# Patient Record
Sex: FEMALE | Race: WHITE | NOT HISPANIC OR LATINO | Employment: OTHER | URBAN - METROPOLITAN AREA
[De-identification: names, ages, dates, MRNs, and addresses within clinical notes are randomized per-mention and may not be internally consistent; named-entity substitution may affect disease eponyms.]

---

## 2017-09-18 ENCOUNTER — HOSPITAL ENCOUNTER (OUTPATIENT)
Dept: FAMILY MEDICINE CLINIC | Facility: CLINIC | Age: 67
Setting detail: SPECIMEN
Discharge: HOME | End: 2017-09-18
Attending: FAMILY MEDICINE | Admitting: FAMILY MEDICINE

## 2018-02-15 ENCOUNTER — OFFICE (AMBULATORY)
Dept: URBAN - METROPOLITAN AREA PATHOLOGY 4 | Facility: PATHOLOGY | Age: 68
End: 2018-02-15

## 2018-02-15 ENCOUNTER — HOSPITAL ENCOUNTER (OUTPATIENT)
Dept: OTHER | Facility: HOSPITAL | Age: 68
Setting detail: SPECIMEN
Discharge: HOME | End: 2018-02-15
Attending: INTERNAL MEDICINE | Admitting: INTERNAL MEDICINE

## 2018-02-15 ENCOUNTER — ON CAMPUS - OUTPATIENT (AMBULATORY)
Dept: URBAN - METROPOLITAN AREA HOSPITAL 2 | Facility: HOSPITAL | Age: 68
End: 2018-02-15

## 2018-02-15 VITALS
HEART RATE: 64 BPM | HEART RATE: 68 BPM | OXYGEN SATURATION: 97 % | HEART RATE: 62 BPM | OXYGEN SATURATION: 98 % | SYSTOLIC BLOOD PRESSURE: 103 MMHG | DIASTOLIC BLOOD PRESSURE: 74 MMHG | DIASTOLIC BLOOD PRESSURE: 70 MMHG | WEIGHT: 193.4 LBS | DIASTOLIC BLOOD PRESSURE: 90 MMHG | SYSTOLIC BLOOD PRESSURE: 127 MMHG | TEMPERATURE: 98.1 F | HEART RATE: 84 BPM | DIASTOLIC BLOOD PRESSURE: 49 MMHG | DIASTOLIC BLOOD PRESSURE: 79 MMHG | HEART RATE: 71 BPM | HEART RATE: 72 BPM | DIASTOLIC BLOOD PRESSURE: 80 MMHG | SYSTOLIC BLOOD PRESSURE: 107 MMHG | HEIGHT: 67 IN | RESPIRATION RATE: 16 BRPM | OXYGEN SATURATION: 92 % | DIASTOLIC BLOOD PRESSURE: 69 MMHG | OXYGEN SATURATION: 96 % | HEART RATE: 66 BPM | HEART RATE: 65 BPM | SYSTOLIC BLOOD PRESSURE: 122 MMHG | DIASTOLIC BLOOD PRESSURE: 73 MMHG | SYSTOLIC BLOOD PRESSURE: 146 MMHG | DIASTOLIC BLOOD PRESSURE: 86 MMHG | SYSTOLIC BLOOD PRESSURE: 116 MMHG | SYSTOLIC BLOOD PRESSURE: 102 MMHG | SYSTOLIC BLOOD PRESSURE: 71 MMHG | RESPIRATION RATE: 18 BRPM

## 2018-02-15 DIAGNOSIS — K57.30 DIVERTICULOSIS OF LARGE INTESTINE WITHOUT PERFORATION OR ABS: ICD-10-CM

## 2018-02-15 DIAGNOSIS — D12.0 BENIGN NEOPLASM OF CECUM: ICD-10-CM

## 2018-02-15 DIAGNOSIS — D12.5 BENIGN NEOPLASM OF SIGMOID COLON: ICD-10-CM

## 2018-02-15 DIAGNOSIS — Z86.010 PERSONAL HISTORY OF COLONIC POLYPS: ICD-10-CM

## 2018-02-15 LAB
GI HISTOLOGY: A. UNSPECIFIED: (no result)
GI HISTOLOGY: B. UNSPECIFIED: (no result)
GI HISTOLOGY: PDF REPORT: (no result)

## 2018-02-15 PROCEDURE — 45385 COLONOSCOPY W/LESION REMOVAL: CPT | Performed by: INTERNAL MEDICINE

## 2018-02-15 PROCEDURE — 88305 TISSUE EXAM BY PATHOLOGIST: CPT | Mod: 26 | Performed by: INTERNAL MEDICINE

## 2018-02-15 RX ADMIN — PROPOFOL: 10 INJECTION, EMULSION INTRAVENOUS at 08:33

## 2018-08-13 ENCOUNTER — HOSPITAL ENCOUNTER (OUTPATIENT)
Dept: FAMILY MEDICINE CLINIC | Facility: CLINIC | Age: 68
Setting detail: SPECIMEN
Discharge: HOME | End: 2018-08-13
Attending: FAMILY MEDICINE | Admitting: FAMILY MEDICINE

## 2018-08-13 LAB
ALBUMIN SERPL-MCNC: 3.9 G/DL (ref 3.5–4.8)
ALBUMIN/GLOB SERPL: 1.3 {RATIO} (ref 1–1.7)
ALP SERPL-CCNC: 40 IU/L (ref 32–91)
ALT SERPL-CCNC: 65 IU/L (ref 14–54)
ANION GAP SERPL CALC-SCNC: 11.5 MMOL/L (ref 10–20)
AST SERPL-CCNC: 51 IU/L (ref 15–41)
BILIRUB SERPL-MCNC: 0.6 MG/DL (ref 0.3–1.2)
BUN SERPL-MCNC: 20 MG/DL (ref 8–20)
BUN/CREAT SERPL: 22.2 (ref 5.4–26.2)
CALCIUM SERPL-MCNC: 9.3 MG/DL (ref 8.9–10.3)
CHLORIDE SERPL-SCNC: 107 MMOL/L (ref 101–111)
CHOLEST SERPL-MCNC: 193 MG/DL
CHOLEST/HDLC SERPL: 2.8 {RATIO}
CONV CO2: 25 MMOL/L (ref 22–32)
CONV LDL CHOLESTEROL DIRECT: 99 MG/DL (ref 0–100)
CONV TOTAL PROTEIN: 6.9 G/DL (ref 6.1–7.9)
CREAT UR-MCNC: 0.9 MG/DL (ref 0.4–1)
GLOBULIN UR ELPH-MCNC: 3 G/DL (ref 2.5–3.8)
GLUCOSE SERPL-MCNC: 100 MG/DL (ref 65–99)
HDLC SERPL-MCNC: 68 MG/DL
LDLC/HDLC SERPL: 1.5 {RATIO}
LIPID INTERPRETATION: ABNORMAL
POTASSIUM SERPL-SCNC: 4.5 MMOL/L (ref 3.6–5.1)
SODIUM SERPL-SCNC: 139 MMOL/L (ref 136–144)
TRIGL SERPL-MCNC: 69 MG/DL
VLDLC SERPL CALC-MCNC: 25.3 MG/DL

## 2018-09-13 ENCOUNTER — ON CAMPUS - OUTPATIENT (AMBULATORY)
Dept: URBAN - METROPOLITAN AREA HOSPITAL 2 | Facility: HOSPITAL | Age: 68
End: 2018-09-13

## 2018-09-13 VITALS
SYSTOLIC BLOOD PRESSURE: 130 MMHG | DIASTOLIC BLOOD PRESSURE: 106 MMHG | DIASTOLIC BLOOD PRESSURE: 77 MMHG | OXYGEN SATURATION: 95 % | OXYGEN SATURATION: 97 % | HEART RATE: 74 BPM | WEIGHT: 189 LBS | OXYGEN SATURATION: 93 % | HEART RATE: 64 BPM | RESPIRATION RATE: 16 BRPM | SYSTOLIC BLOOD PRESSURE: 137 MMHG | DIASTOLIC BLOOD PRESSURE: 88 MMHG | OXYGEN SATURATION: 94 % | RESPIRATION RATE: 18 BRPM | SYSTOLIC BLOOD PRESSURE: 146 MMHG | HEART RATE: 72 BPM | DIASTOLIC BLOOD PRESSURE: 98 MMHG | HEART RATE: 75 BPM | RESPIRATION RATE: 14 BRPM | DIASTOLIC BLOOD PRESSURE: 89 MMHG | SYSTOLIC BLOOD PRESSURE: 150 MMHG | HEART RATE: 69 BPM | HEART RATE: 77 BPM | HEIGHT: 67 IN | DIASTOLIC BLOOD PRESSURE: 81 MMHG | OXYGEN SATURATION: 96 % | RESPIRATION RATE: 19 BRPM | SYSTOLIC BLOOD PRESSURE: 118 MMHG | TEMPERATURE: 98.6 F

## 2018-09-13 DIAGNOSIS — K44.9 DIAPHRAGMATIC HERNIA WITHOUT OBSTRUCTION OR GANGRENE: ICD-10-CM

## 2018-09-13 DIAGNOSIS — K22.2 ESOPHAGEAL OBSTRUCTION: ICD-10-CM

## 2018-09-13 DIAGNOSIS — K25.9 GASTRIC ULCER, UNSPECIFIED AS ACUTE OR CHRONIC, WITHOUT HEMO: ICD-10-CM

## 2018-09-13 DIAGNOSIS — R13.10 DYSPHAGIA, UNSPECIFIED: ICD-10-CM

## 2018-09-13 PROCEDURE — 43249 ESOPH EGD DILATION <30 MM: CPT | Performed by: INTERNAL MEDICINE

## 2018-09-13 RX ORDER — PANTOPRAZOLE SODIUM 40 MG/1
TABLET, DELAYED RELEASE ORAL
Qty: 90 | Refills: 1 | Status: COMPLETED
End: 2021-11-22

## 2019-08-21 RX ORDER — ROSUVASTATIN CALCIUM 20 MG/1
TABLET, COATED ORAL
Qty: 90 TABLET | Refills: 1 | Status: SHIPPED | OUTPATIENT
Start: 2019-08-21 | End: 2020-02-24 | Stop reason: SDUPTHER

## 2019-09-03 RX ORDER — LISINOPRIL 10 MG/1
TABLET ORAL
Qty: 90 TABLET | Refills: 0 | Status: SHIPPED | OUTPATIENT
Start: 2019-09-03 | End: 2019-09-15 | Stop reason: SDUPTHER

## 2019-09-11 RX ORDER — LEVOTHYROXINE SODIUM 175 UG/1
TABLET ORAL
Qty: 90 TABLET | Refills: 1 | Status: SHIPPED | OUTPATIENT
Start: 2019-09-11 | End: 2019-09-30

## 2019-09-16 RX ORDER — LISINOPRIL 10 MG/1
TABLET ORAL
Qty: 90 TABLET | Refills: 0 | Status: SHIPPED | OUTPATIENT
Start: 2019-09-16 | End: 2021-11-05 | Stop reason: SDUPTHER

## 2019-09-25 DIAGNOSIS — Z00.00 ENCOUNTER FOR GENERAL ADULT MEDICAL EXAMINATION WITHOUT ABNORMAL FINDINGS: ICD-10-CM

## 2019-09-25 DIAGNOSIS — I10 HYPERTENSION, UNSPECIFIED TYPE: ICD-10-CM

## 2019-09-25 DIAGNOSIS — E78.5 HYPERLIPIDEMIA, UNSPECIFIED HYPERLIPIDEMIA TYPE: Primary | ICD-10-CM

## 2019-09-25 DIAGNOSIS — E03.9 HYPOTHYROIDISM, UNSPECIFIED TYPE: ICD-10-CM

## 2019-09-26 LAB
ALBUMIN SERPL-MCNC: 3.8 G/DL (ref 3.5–4.8)
ALBUMIN/GLOB SERPL: 1.3 G/DL (ref 1–1.7)
ALP SERPL-CCNC: 44 U/L (ref 32–91)
ALT SERPL W P-5'-P-CCNC: 69 U/L (ref 14–54)
ANION GAP SERPL CALCULATED.3IONS-SCNC: 12 MMOL/L (ref 5–15)
ARTICHOKE IGE QN: 131 MG/DL (ref 0–100)
AST SERPL-CCNC: 43 U/L (ref 15–41)
BASOPHILS # BLD AUTO: 0.1 10*3/MM3 (ref 0–0.2)
BASOPHILS NFR BLD AUTO: 1.1 % (ref 0–1.5)
BILIRUB SERPL-MCNC: 0.6 MG/DL (ref 0.3–1.2)
BUN BLD-MCNC: 16 MG/DL (ref 8–20)
BUN/CREAT SERPL: 20 (ref 5.4–26.2)
CALCIUM SPEC-SCNC: 9 MG/DL (ref 8.9–10.3)
CHLORIDE SERPL-SCNC: 105 MMOL/L (ref 101–111)
CHOLEST SERPL-MCNC: 219 MG/DL
CO2 SERPL-SCNC: 27 MMOL/L (ref 22–32)
CREAT BLD-MCNC: 0.8 MG/DL (ref 0.4–1)
DEPRECATED RDW RBC AUTO: 45.1 FL (ref 37–54)
EOSINOPHIL # BLD AUTO: 0.1 10*3/MM3 (ref 0–0.4)
EOSINOPHIL NFR BLD AUTO: 3.1 % (ref 0.3–6.2)
ERYTHROCYTE [DISTWIDTH] IN BLOOD BY AUTOMATED COUNT: 13.5 % (ref 12.3–15.4)
GFR SERPL CREATININE-BSD FRML MDRD: 71 ML/MIN/1.73
GLOBULIN UR ELPH-MCNC: 2.9 GM/DL (ref 2.5–3.8)
GLUCOSE BLD-MCNC: 105 MG/DL (ref 65–99)
HCT VFR BLD AUTO: 42.6 % (ref 34–46.6)
HDLC SERPL QL: 2.84
HDLC SERPL-MCNC: 77 MG/DL
HGB BLD-MCNC: 14.5 G/DL (ref 12–15.9)
LDLC/HDLC SERPL: 1.62 {RATIO}
LYMPHOCYTES # BLD AUTO: 2.1 10*3/MM3 (ref 0.7–3.1)
LYMPHOCYTES NFR BLD AUTO: 44.4 % (ref 19.6–45.3)
MCH RBC QN AUTO: 32.8 PG (ref 26.6–33)
MCHC RBC AUTO-ENTMCNC: 33.9 G/DL (ref 31.5–35.7)
MCV RBC AUTO: 96.5 FL (ref 79–97)
MONOCYTES # BLD AUTO: 0.5 10*3/MM3 (ref 0.1–0.9)
MONOCYTES NFR BLD AUTO: 9.7 % (ref 5–12)
NEUTROPHILS # BLD AUTO: 2 10*3/MM3 (ref 1.7–7)
NEUTROPHILS NFR BLD AUTO: 41.7 % (ref 42.7–76)
NRBC BLD AUTO-RTO: 0.1 /100 WBC (ref 0–0.2)
PLATELET # BLD AUTO: 199 10*3/MM3 (ref 140–450)
PMV BLD AUTO: 9.1 FL (ref 6–12)
POTASSIUM BLD-SCNC: 4 MMOL/L (ref 3.6–5.1)
PROT SERPL-MCNC: 6.7 G/DL (ref 6.1–7.9)
RBC # BLD AUTO: 4.42 10*6/MM3 (ref 3.77–5.28)
SODIUM BLD-SCNC: 140 MMOL/L (ref 136–144)
TRIGL SERPL-MCNC: 88 MG/DL
TSH SERPL DL<=0.05 MIU/L-ACNC: 0.23 UIU/ML (ref 0.34–5.6)
VLDLC SERPL-MCNC: 17.6 MG/DL
WBC NRBC COR # BLD: 4.7 10*3/MM3 (ref 3.4–10.8)

## 2019-09-26 PROCEDURE — 85025 COMPLETE CBC W/AUTO DIFF WBC: CPT | Performed by: FAMILY MEDICINE

## 2019-09-26 PROCEDURE — 84443 ASSAY THYROID STIM HORMONE: CPT | Performed by: FAMILY MEDICINE

## 2019-09-26 PROCEDURE — 80061 LIPID PANEL: CPT | Performed by: FAMILY MEDICINE

## 2019-09-26 PROCEDURE — 80053 COMPREHEN METABOLIC PANEL: CPT | Performed by: FAMILY MEDICINE

## 2019-09-26 PROCEDURE — 36415 COLL VENOUS BLD VENIPUNCTURE: CPT | Performed by: FAMILY MEDICINE

## 2019-09-30 ENCOUNTER — OFFICE VISIT (OUTPATIENT)
Dept: FAMILY MEDICINE CLINIC | Facility: CLINIC | Age: 69
End: 2019-09-30

## 2019-09-30 VITALS
HEIGHT: 69 IN | DIASTOLIC BLOOD PRESSURE: 83 MMHG | WEIGHT: 179 LBS | OXYGEN SATURATION: 96 % | BODY MASS INDEX: 26.51 KG/M2 | SYSTOLIC BLOOD PRESSURE: 137 MMHG | HEART RATE: 104 BPM | TEMPERATURE: 98 F

## 2019-09-30 DIAGNOSIS — Z12.39 SCREENING FOR BREAST CANCER: ICD-10-CM

## 2019-09-30 DIAGNOSIS — Z78.0 POSTMENOPAUSAL: ICD-10-CM

## 2019-09-30 DIAGNOSIS — Z00.00 ENCOUNTER FOR GENERAL ADULT MEDICAL EXAMINATION WITHOUT ABNORMAL FINDINGS: Primary | ICD-10-CM

## 2019-09-30 PROCEDURE — G0439 PPPS, SUBSEQ VISIT: HCPCS | Performed by: FAMILY MEDICINE

## 2019-09-30 RX ORDER — MELOXICAM 15 MG/1
TABLET ORAL DAILY
Refills: 0 | COMMUNITY
Start: 2019-09-15 | End: 2021-09-10 | Stop reason: SDUPTHER

## 2019-09-30 RX ORDER — LEVOTHYROXINE SODIUM 150 MCG
150 TABLET ORAL DAILY
Qty: 30 TABLET | Refills: 1 | Status: SHIPPED | OUTPATIENT
Start: 2019-09-30 | End: 2019-11-21 | Stop reason: SDUPTHER

## 2019-09-30 RX ORDER — CYANOCOBALAMIN (VITAMIN B-12) 1000 MCG
TABLET ORAL EVERY 24 HOURS
COMMUNITY
Start: 2018-08-13

## 2019-09-30 RX ORDER — PANTOPRAZOLE SODIUM 40 MG/1
TABLET, DELAYED RELEASE ORAL
Refills: 2 | COMMUNITY
Start: 2019-08-15 | End: 2021-05-24 | Stop reason: SDUPTHER

## 2019-09-30 RX ORDER — FLUOXETINE HYDROCHLORIDE 20 MG/1
CAPSULE ORAL
Refills: 3 | COMMUNITY
Start: 2019-09-15 | End: 2019-12-12 | Stop reason: SDUPTHER

## 2019-09-30 RX ORDER — CHOLECALCIFEROL (VITAMIN D3) 125 MCG
CAPSULE ORAL EVERY 24 HOURS
COMMUNITY
Start: 2018-08-13

## 2019-09-30 RX ORDER — EPINEPHRINE 0.3 MG/.3ML
INJECTION SUBCUTANEOUS
COMMUNITY
Start: 2012-05-08 | End: 2021-05-03 | Stop reason: SDUPTHER

## 2019-09-30 NOTE — PATIENT INSTRUCTIONS
Medicare Wellness  Personal Prevention Plan of Service     Date of Office Visit:  2019  Encounter Provider:  Luz Perdomo MD  Place of Service:  [facility]  Patient Name: Africa Licona  :  1950    As part of the Medicare Wellness portion of your visit today, we are providing you with this personalized preventive plan of services (PPPS). This plan is based upon recommendations of the [place] Preventive Services Task Force (USPSTF) and the Advisory Committee on Immunization Practices (ACIP).    This lists the preventive care services that should be considered, and provides dates of when you are due. Items listed as completed are up-to-date and do not require any further intervention.    Health Maintenance   Topic Date Due   • TDAP/TD VACCINES (1 - Tdap) 10/16/1967   • ZOSTER VACCINE (1 of 2) 10/16/1998   • MAMMOGRAM  2012   • PNEUMOCOCCAL VACCINES (65+ LOW/MEDIUM RISK) (1 of 2 - PCV13) 10/16/2013   • INFLUENZA VACCINE  2019   • HEPATITIS C SCREENING  2019   • MEDICARE ANNUAL WELLNESS  2019   • LIPID PANEL  2020   • COLONOSCOPY  02/15/2028       Orders Placed This Encounter   Procedures   • Mammo Screening Digital Tomosynthesis Bilateral With CAD     Order Specific Question:   Reason for Exam:     Answer:   screen for breast cancer   • DEXA Bone Density Axial     Order Specific Question:   Reason for Exam:     Answer:   screen for osteoporosis       No Follow-up on file.

## 2019-09-30 NOTE — PROGRESS NOTES
Medicare Wellness Visit   The ABC's of the Annual Wellness Visit    Chief Complaint   Patient presents with   • Medicare Wellness-subsequent       HPI:  Africa Licona, -1950, is a 68 y.o. female who presents for a Medicare Wellness Visit.    Recent Hospitalizations:  No hospitalization(s) within the last year..    Current Medical Providers:  Patient Care Team:  Luz Perdomo MD as PCP - Saundra Lopez APRN as PCP - [facility]  Keshawn Conrad MD as Consulting Physician (Orthopedic Surgery)    Health Habits and Functional and Cognitive Screening and Depression Screening:  Functional & Cognitive Status 2019   Do you have difficulty preparing food and eating? No   Do you have difficulty bathing yourself, getting dressed or grooming yourself? No   Do you have difficulty using the toilet? No   Do you have difficulty moving around from place to place? No   Do you have trouble with steps or getting out of a bed or a chair? No   Current Diet Well Balanced Diet   Dental Exam Up to date   Eye Exam Up to date   Exercise (times per week) 3 times per week   Current Exercise Activities Include Aerobics   Do you need help using the phone?  No   Are you deaf or do you have serious difficulty hearing?  Yes   Do you need help with transportation? No   Do you need help shopping? No   Do you need help preparing meals?  No   Do you need help with housework?  No   Do you need help with laundry? No   Do you need help taking your medications? No   Do you need help managing money? No   Do you ever drive or ride in a car without wearing a seat belt? No   Have you felt unusual stress, anger or loneliness in the last month? No   Who do you live with? Spouse   If you need help, do you have trouble finding someone available to you? No   Have you been bothered in the last four weeks by sexual problems? No   Do you have difficulty concentrating, remembering or making decisions? No       Compared to one  year ago, the patient feels her physical health is the same and her mental health is the same.    Depression Screen:  PHQ-2/PHQ-9 Depression Screening 9/30/2019   Little interest or pleasure in doing things 0   Feeling down, depressed, or hopeless 0   Trouble falling or staying asleep, or sleeping too much 0   Feeling tired or having little energy 0   Poor appetite or overeating 0   Feeling bad about yourself - or that you are a failure or have let yourself or your family down 0   Trouble concentrating on things, such as reading the newspaper or watching television 0   Moving or speaking so slowly that other people could have noticed. Or the opposite - being so fidgety or restless that you have been moving around a lot more than usual 0   Thoughts that you would be better off dead, or of hurting yourself in some way 0   Total Score 0   If you checked off any problems, how difficult have these problems made it for you to do your work, take care of things at home, or get along with other people? Not difficult at all         Past Medical/Family/Social History:  The following portions of the patient's history were reviewed and updated as appropriate: allergies, current medications, past family history, past medical history, past social history, past surgical history and problem list.    Allergies   Allergen Reactions   • Other Unknown (See Comments)     Wasps           Current Outpatient Medications:   •  Cholecalciferol (VITAMIN D3) 2000 units tablet, Daily., Disp: , Rfl:   •  Cyanocobalamin (B-12) 1000 MCG tablet, Daily., Disp: , Rfl:   •  FLUoxetine (PROzac) 20 MG capsule, TK ONE C PO QD, Disp: , Rfl: 3  •  meloxicam (MOBIC) 15 MG tablet, Take  by mouth Daily., Disp: , Rfl: 0  •  pantoprazole (PROTONIX) 40 MG EC tablet, TK 1 T PO QAM FOR 90 DAYS, Disp: , Rfl: 2  •  EPINEPHrine (EPIPEN) 0.3 MG/0.3ML solution auto-injector injection, EPIPEN 0.3 MG/0.3ML CANDIDO, Disp: , Rfl:   •  lisinopril (PRINIVIL,ZESTRIL) 10 MG  "tablet, TAKE 1 TABLET BY MOUTH DAILY, Disp: 90 tablet, Rfl: 0  •  rosuvastatin (CRESTOR) 20 MG tablet, TAKE 1 TABLET BY MOUTH AT BEDTIME, Disp: 90 tablet, Rfl: 1  •  SYNTHROID 150 MCG tablet, Take 1 tablet by mouth Daily., Disp: 30 tablet, Rfl: 1    Aspirin use counseling: Does not need ASA (and currently is not on it)    Current medication list contains no high risk medications.  No harmful drug interactions have been identified.     History reviewed. No pertinent family history.    Social History     Tobacco Use   • Smoking status: Former Smoker     Types: Cigarettes   • Smokeless tobacco: Never Used   • Tobacco comment: quit 25 yrs ago   Substance Use Topics   • Alcohol use: Yes     Frequency: Monthly or less     Comment: caffeine 1c d       Past Surgical History:   Procedure Laterality Date   • COLONOSCOPY  02/2018       Patient Active Problem List   Diagnosis   • Encounter for general adult medical examination without abnormal findings   • Hyperlipidemia   • Hypertension   • Hypothyroidism   • Screening for breast cancer   • Postmenopausal       Review of Systems   Constitutional: Negative for chills and fever.   HENT: Negative for sinus pressure and sore throat.    Eyes: Negative for blurred vision.   Respiratory: Negative for cough and shortness of breath.    Cardiovascular: Negative for chest pain and palpitations.   Gastrointestinal: Negative for abdominal pain.   Endocrine: Negative for polyuria.   Skin: Negative for rash.   Neurological: Negative for dizziness and headache.   Hematological: Negative for adenopathy.   Psychiatric/Behavioral: Negative for depressed mood.       Objective     Vitals:    09/30/19 1305   BP: 137/83   BP Location: Left arm   Patient Position: Sitting   Cuff Size: Adult   Pulse: 104   Temp: 98 °F (36.7 °C)   TempSrc: Oral   SpO2: 96%   Weight: 81.2 kg (179 lb)   Height: 174 cm (68.5\")       Patient's Body mass index is 26.82 kg/m². BMI is within normal parameters. No follow-up " required..      No exam data present    The patient has no evidence of cognitve impairment.     Physical Exam   Constitutional: She is oriented to person, place, and time. She appears well-developed. No distress.   HENT:   Head: Normocephalic.   Right Ear: Hearing, tympanic membrane and ear canal normal.   Left Ear: Hearing, tympanic membrane and ear canal normal.   Eyes: Conjunctivae, EOM and lids are normal. Pupils are equal, round, and reactive to light.   Neck: Normal range of motion. Neck supple. No thyroid mass and no thyromegaly present.   Cardiovascular: Normal rate and regular rhythm.   Pulmonary/Chest: Effort normal and breath sounds normal.   Abdominal: Soft. Bowel sounds are normal. There is no tenderness.   Musculoskeletal: Normal range of motion.   Lymphadenopathy:     She has no cervical adenopathy.   Neurological: She is alert and oriented to person, place, and time.   Skin: Skin is warm and dry.   Psychiatric: She has a normal mood and affect. Her speech is normal.   Nursing note and vitals reviewed.      Recent Lab Results:     Lab Results   Component Value Date    CHOL 219 (H) 09/26/2019    TRIG 88 09/26/2019    HDL 77 09/26/2019    VLDL 17.6 09/26/2019    LDLHDL 1.62 09/26/2019     Orders Only on 09/25/2019   Component Date Value Ref Range Status   • Glucose 09/26/2019 105* 65 - 99 mg/dL Final   • BUN 09/26/2019 16  8 - 20 mg/dL Final   • Creatinine 09/26/2019 0.80  0.40 - 1.00 mg/dL Final   • Sodium 09/26/2019 140  136 - 144 mmol/L Final   • Potassium 09/26/2019 4.0  3.6 - 5.1 mmol/L Final   • Chloride 09/26/2019 105  101 - 111 mmol/L Final   • CO2 09/26/2019 27.0  22.0 - 32.0 mmol/L Final   • Calcium 09/26/2019 9.0  8.9 - 10.3 mg/dL Final   • Total Protein 09/26/2019 6.7  6.1 - 7.9 g/dL Final   • Albumin 09/26/2019 3.80  3.50 - 4.80 g/dL Final   • ALT (SGPT) 09/26/2019 69* 14 - 54 U/L Final   • AST (SGOT) 09/26/2019 43* 15 - 41 U/L Final   • Alkaline Phosphatase 09/26/2019 44  32 - 91 U/L Final    • Total Bilirubin 09/26/2019 0.6  0.3 - 1.2 mg/dL Final   • eGFR Non African Amer 09/26/2019 71  >60 mL/min/1.73 Final   • Globulin 09/26/2019 2.9  2.5 - 3.8 gm/dL Final   • A/G Ratio 09/26/2019 1.3  1.0 - 1.7 g/dL Final   • BUN/Creatinine Ratio 09/26/2019 20.0  5.4 - 26.2 Final   • Anion Gap 09/26/2019 12.0  5.0 - 15.0 mmol/L Final   • Total Cholesterol 09/26/2019 219* <=200 mg/dL Final   • Triglycerides 09/26/2019 88  <=150 mg/dL Final   • HDL Cholesterol 09/26/2019 77  >=39 mg/dL Final   • LDL Cholesterol  09/26/2019 131* 0 - 100 mg/dL Final   • VLDL Cholesterol 09/26/2019 17.6  mg/dL Final   • LDL/HDL Ratio 09/26/2019 1.62   Final   • Chol/HDL Ratio 09/26/2019 2.84   Final   • TSH 09/26/2019 0.230* 0.340 - 5.600 uIU/mL Final    Results may be falsely decreased if patient taking Biotin.   • WBC 09/26/2019 4.70  3.40 - 10.80 10*3/mm3 Final   • RBC 09/26/2019 4.42  3.77 - 5.28 10*6/mm3 Final   • Hemoglobin 09/26/2019 14.5  12.0 - 15.9 g/dL Final   • Hematocrit 09/26/2019 42.6  34.0 - 46.6 % Final   • MCV 09/26/2019 96.5  79.0 - 97.0 fL Final   • MCH 09/26/2019 32.8  26.6 - 33.0 pg Final   • MCHC 09/26/2019 33.9  31.5 - 35.7 g/dL Final   • RDW 09/26/2019 13.5  12.3 - 15.4 % Final   • RDW-SD 09/26/2019 45.1  37.0 - 54.0 fl Final   • MPV 09/26/2019 9.1  6.0 - 12.0 fL Final   • Platelets 09/26/2019 199  140 - 450 10*3/mm3 Final   • Neutrophil % 09/26/2019 41.7* 42.7 - 76.0 % Final   • Lymphocyte % 09/26/2019 44.4  19.6 - 45.3 % Final   • Monocyte % 09/26/2019 9.7  5.0 - 12.0 % Final   • Eosinophil % 09/26/2019 3.1  0.3 - 6.2 % Final   • Basophil % 09/26/2019 1.1  0.0 - 1.5 % Final   • Neutrophils, Absolute 09/26/2019 2.00  1.70 - 7.00 10*3/mm3 Final   • Lymphocytes, Absolute 09/26/2019 2.10  0.70 - 3.10 10*3/mm3 Final   • Monocytes, Absolute 09/26/2019 0.50  0.10 - 0.90 10*3/mm3 Final   • Eosinophils, Absolute 09/26/2019 0.10  0.00 - 0.40 10*3/mm3 Final   • Basophils, Absolute 09/26/2019 0.10  0.00 - 0.20 10*3/mm3  Final   • nRBC 09/26/2019 0.1  0.0 - 0.2 /100 WBC Final         Assessment/Plan   Age-appropriate Screening Schedule:  Refer to the list below for future screening recommendations based on patient's age, sex and/or medical conditions.      Health Maintenance   Topic Date Due   • TDAP/TD VACCINES (1 - Tdap) 10/16/1967   • ZOSTER VACCINE (1 of 2) 10/16/1998   • MAMMOGRAM  04/14/2012   • PNEUMOCOCCAL VACCINES (65+ LOW/MEDIUM RISK) (1 of 2 - PCV13) 10/16/2013   • INFLUENZA VACCINE  08/01/2019   • LIPID PANEL  09/26/2020   • COLONOSCOPY  02/15/2028       Medicare Risks and Personalized Health Plan:  Advance Directive Discussion  Breast Cancer/Mammogram Screening  Colon Cancer Screening  Depression/Dysphoria  Fall Risk  Glaucoma Risk  Immunizations Discussed/Encouraged (specific immunizations; Influenza )  Osteoprorosis Risk      CMS-Preventive Services Quick Reference  Medicare Preventive Services Addressed:  Annual Wellness Visit (AWV)  Bone Density Measurements  Colorectal Cancer Screening, Colonoscopy  Diabetes Screening-Lab Order for either glucose quantitative blood (except reagent strip), glucose;post glucose dose(includes glucose), or glucose tolerance test-3 specimens(includes glucose)  Influenza Vaccine and Administration  Screening Mammography     Advance Care Planning:  Patient has an advance directive - a copy has not been provided. Have asked the patient to send this to us to add to record    Diagnoses and all orders for this visit:    1. Encounter for general adult medical examination without abnormal findings (Primary)    2. Screening for breast cancer  -     Mammo Screening Digital Tomosynthesis Bilateral With CAD    3. Postmenopausal  -     DEXA Bone Density Axial    Other orders  -     SYNTHROID 150 MCG tablet; Take 1 tablet by mouth Daily.  Dispense: 30 tablet; Refill: 1        An After Visit Summary and PPPS with all of these plans were given to the patient.      Follow Up:  Return in about 1 year  (around 9/30/2020) for Medicare Wellness.

## 2019-11-18 ENCOUNTER — TELEPHONE (OUTPATIENT)
Dept: FAMILY MEDICINE CLINIC | Facility: CLINIC | Age: 69
End: 2019-11-18

## 2019-11-18 DIAGNOSIS — E03.9 HYPOTHYROIDISM, UNSPECIFIED TYPE: Primary | ICD-10-CM

## 2019-11-20 PROCEDURE — 36415 COLL VENOUS BLD VENIPUNCTURE: CPT | Performed by: FAMILY MEDICINE

## 2019-11-20 PROCEDURE — 84443 ASSAY THYROID STIM HORMONE: CPT | Performed by: FAMILY MEDICINE

## 2019-11-21 LAB — TSH SERPL DL<=0.05 MIU/L-ACNC: 1.98 UIU/ML (ref 0.27–4.2)

## 2019-11-21 RX ORDER — LEVOTHYROXINE SODIUM 150 MCG
150 TABLET ORAL DAILY
Qty: 30 TABLET | Refills: 2 | Status: SHIPPED | OUTPATIENT
Start: 2019-11-21 | End: 2020-04-13

## 2019-12-13 RX ORDER — FLUOXETINE HYDROCHLORIDE 20 MG/1
20 CAPSULE ORAL DAILY
Qty: 90 CAPSULE | Refills: 1 | Status: SHIPPED | OUTPATIENT
Start: 2019-12-13 | End: 2020-06-12

## 2019-12-13 RX ORDER — FLUOXETINE HYDROCHLORIDE 20 MG/1
CAPSULE ORAL
Qty: 90 CAPSULE | Refills: 0 | OUTPATIENT
Start: 2019-12-13

## 2020-02-24 RX ORDER — ROSUVASTATIN CALCIUM 20 MG/1
20 TABLET, COATED ORAL
Qty: 90 TABLET | Refills: 1 | Status: SHIPPED | OUTPATIENT
Start: 2020-02-24 | End: 2020-08-06

## 2020-04-13 RX ORDER — LEVOTHYROXINE SODIUM 150 MCG
150 TABLET ORAL DAILY
Qty: 30 TABLET | Refills: 0 | Status: SHIPPED | OUTPATIENT
Start: 2020-04-13 | End: 2020-05-08 | Stop reason: SDUPTHER

## 2020-04-20 ENCOUNTER — TELEPHONE (OUTPATIENT)
Dept: FAMILY MEDICINE CLINIC | Facility: CLINIC | Age: 70
End: 2020-04-20

## 2020-04-20 ENCOUNTER — OFFICE VISIT (OUTPATIENT)
Dept: FAMILY MEDICINE CLINIC | Facility: CLINIC | Age: 70
End: 2020-04-20

## 2020-04-20 DIAGNOSIS — R21 RASH OF FACE: Primary | ICD-10-CM

## 2020-04-20 PROBLEM — F32.A DEPRESSION: Status: ACTIVE | Noted: 2020-04-20

## 2020-04-20 PROBLEM — K22.2 ESOPHAGEAL STRICTURE: Status: ACTIVE | Noted: 2018-08-13

## 2020-04-20 PROCEDURE — 99442 PR PHYS/QHP TELEPHONE EVALUATION 11-20 MIN: CPT | Performed by: FAMILY MEDICINE

## 2020-04-20 RX ORDER — METHYLPREDNISOLONE 4 MG/1
TABLET ORAL
Qty: 1 EACH | Refills: 0 | Status: SHIPPED | OUTPATIENT
Start: 2020-04-20 | End: 2020-08-25 | Stop reason: SDUPTHER

## 2020-04-20 NOTE — TELEPHONE ENCOUNTER
I called this patient for appointment today, but no answer.  Please try to call her from the office phone number to see if she still wants this appointment.  Thank you.

## 2020-04-20 NOTE — PROGRESS NOTES
Subjective   Chief Complaint   Patient presents with   • Rash     Africa Licona is a 69 y.o. female.     Patient Care Team:  Luz Perdomo MD as PCP - General  Rosa Church MD as PCP - Keshawn Alvarez MD as Consulting Physician (Orthopedic Surgery)     You have chosen to receive care through a telephone visit. Do you consent to use a telephone visit for your medical care today? Yes      Patient is being evaluated today through telemedicine appointment and review of COVID-19 pandemic.  She is being evaluated due to rash, which started on her face last night.  It is close to the eye areas on both sides.  It is causing some itchiness.  She is concerned that she might have poison ivy as she has had rashes like this in the past and it was due to poison ivy.  She was working in her garden few days ago and rash started down the road.  She also has some mild rash on her arms.  She denies any difficulty breathing or any other skin issues.       The following portions of the patient's history were reviewed and updated as appropriate: allergies, current medications, past family history, past medical history, past social history, past surgical history and problem list.  Past Medical History:   Diagnosis Date   • Hyperlipidemia    • Hypothyroidism      Past Surgical History:   Procedure Laterality Date   • COLONOSCOPY  02/2018     The patient has a family history of  Family History   Problem Relation Age of Onset   • Hypertension Other      Social History     Socioeconomic History   • Marital status: Single     Spouse name: Not on file   • Number of children: Not on file   • Years of education: Not on file   • Highest education level: Not on file   Tobacco Use   • Smoking status: Former Smoker     Types: Cigarettes   • Smokeless tobacco: Never Used   • Tobacco comment: quit 25 yrs ago   Substance and Sexual Activity   • Alcohol use: Yes     Frequency: Monthly or less     Comment: caffeine 1c d        Review of Systems   Constitutional: Negative for chills, fatigue and fever.   Respiratory: Negative for cough, chest tightness, shortness of breath and wheezing.    Gastrointestinal: Negative for diarrhea, nausea and vomiting.   Skin: Positive for rash. Negative for pallor and skin lesions.     There were no vitals taken for this visit.    Current Outpatient Medications:   •  Cholecalciferol (VITAMIN D3) 2000 units tablet, Daily., Disp: , Rfl:   •  Cyanocobalamin (B-12) 1000 MCG tablet, Daily., Disp: , Rfl:   •  EPINEPHrine (EPIPEN) 0.3 MG/0.3ML solution auto-injector injection, EPIPEN 0.3 MG/0.3ML CANDIDO, Disp: , Rfl:   •  FLUoxetine (PROzac) 20 MG capsule, Take 1 capsule by mouth Daily., Disp: 90 capsule, Rfl: 1  •  lisinopril (PRINIVIL,ZESTRIL) 10 MG tablet, TAKE 1 TABLET BY MOUTH DAILY, Disp: 90 tablet, Rfl: 0  •  meloxicam (MOBIC) 15 MG tablet, Take  by mouth Daily., Disp: , Rfl: 0  •  methylPREDNISolone (Medrol) 4 MG tablet, Take as directed on package instructions., Disp: 1 each, Rfl: 0  •  pantoprazole (PROTONIX) 40 MG EC tablet, TK 1 T PO QAM FOR 90 DAYS, Disp: , Rfl: 2  •  rosuvastatin (CRESTOR) 20 MG tablet, Take 1 tablet by mouth every night at bedtime., Disp: 90 tablet, Rfl: 1  •  SYNTHROID 150 MCG tablet, TAKE 1 TABLET BY MOUTH DAILY, Disp: 30 tablet, Rfl: 0    Objective   Physical Exam   Constitutional: She is oriented to person, place, and time. No distress.   Patient is being evaluated on the phone, she is pleasant and cooperative, she is in no distress, normal voice and speech.   Neurological: She is alert and oriented to person, place, and time.   Psychiatric: She has a normal mood and affect. Judgment and thought content normal.            No visits with results within 7 Day(s) from this visit.   Latest known visit with results is:   Orders Only on 11/18/2019   Component Date Value Ref Range Status   • TSH 11/20/2019 1.980  0.270 - 4.200 uIU/mL Final                 Assessment/Plan   Problems  Addressed this Visit        Musculoskeletal and Integument    Rash of face - Primary        According to her symptoms descriptions I suspect that she has poison ivy dermatitis.  I will be starting her on Medrol Dosepak and skin care was discussed.  She may also take Benadryl if still needed.  She was advised to monitor her symptoms and call us back if not getting better or getting worse.    I spent 15 minutes evaluating this patient's concerns and symptoms and working on this encounter.             Requested Prescriptions     Signed Prescriptions Disp Refills   • methylPREDNISolone (Medrol) 4 MG tablet 1 each 0     Sig: Take as directed on package instructions.

## 2020-05-08 RX ORDER — LEVOTHYROXINE SODIUM 150 MCG
150 TABLET ORAL DAILY
Qty: 30 TABLET | Refills: 2 | Status: SHIPPED | OUTPATIENT
Start: 2020-05-08 | End: 2020-10-06

## 2020-05-08 RX ORDER — LEVOTHYROXINE SODIUM 150 MCG
150 TABLET ORAL DAILY
Qty: 30 TABLET | Refills: 0 | OUTPATIENT
Start: 2020-05-08

## 2020-05-28 ENCOUNTER — LAB (OUTPATIENT)
Dept: LAB | Facility: HOSPITAL | Age: 70
End: 2020-05-28

## 2020-05-28 ENCOUNTER — HOSPITAL ENCOUNTER (OUTPATIENT)
Dept: CARDIOLOGY | Facility: HOSPITAL | Age: 70
Discharge: HOME | End: 2020-05-28
Admitting: ANESTHESIOLOGY

## 2020-05-28 ENCOUNTER — TRANSCRIBE ORDERS (OUTPATIENT)
Dept: ADMINISTRATIVE | Facility: HOSPITAL | Age: 70
End: 2020-05-28

## 2020-05-28 DIAGNOSIS — I10 ESSENTIAL HYPERTENSION: ICD-10-CM

## 2020-05-28 DIAGNOSIS — I10 ESSENTIAL HYPERTENSION: Primary | ICD-10-CM

## 2020-05-28 LAB
ALBUMIN SERPL-MCNC: 4.4 G/DL (ref 3.5–5.2)
ALBUMIN/GLOB SERPL: 1.6 G/DL
ALP SERPL-CCNC: 52 U/L (ref 39–117)
ALT SERPL W P-5'-P-CCNC: 97 U/L (ref 1–33)
ANION GAP SERPL CALCULATED.3IONS-SCNC: 12.1 MMOL/L (ref 5–15)
AST SERPL-CCNC: 95 U/L (ref 1–32)
BACTERIA UR QL AUTO: ABNORMAL /HPF
BASOPHILS # BLD AUTO: 0.04 10*3/MM3 (ref 0–0.2)
BASOPHILS NFR BLD AUTO: 0.8 % (ref 0–1.5)
BILIRUB SERPL-MCNC: 0.6 MG/DL (ref 0.2–1.2)
BILIRUB UR QL STRIP: NEGATIVE
BUN BLD-MCNC: 15 MG/DL (ref 8–23)
BUN/CREAT SERPL: 16.3 (ref 7–25)
CALCIUM SPEC-SCNC: 9.4 MG/DL (ref 8.6–10.5)
CHLORIDE SERPL-SCNC: 103 MMOL/L (ref 98–107)
CLARITY UR: CLEAR
CO2 SERPL-SCNC: 23.9 MMOL/L (ref 22–29)
COLOR UR: YELLOW
CREAT BLD-MCNC: 0.92 MG/DL (ref 0.57–1)
DEPRECATED RDW RBC AUTO: 49.5 FL (ref 37–54)
EOSINOPHIL # BLD AUTO: 0.19 10*3/MM3 (ref 0–0.4)
EOSINOPHIL NFR BLD AUTO: 3.6 % (ref 0.3–6.2)
ERYTHROCYTE [DISTWIDTH] IN BLOOD BY AUTOMATED COUNT: 15.3 % (ref 12.3–15.4)
GFR SERPL CREATININE-BSD FRML MDRD: 60 ML/MIN/1.73
GLOBULIN UR ELPH-MCNC: 2.7 GM/DL
GLUCOSE BLD-MCNC: 109 MG/DL (ref 65–99)
GLUCOSE UR STRIP-MCNC: NEGATIVE MG/DL
HCT VFR BLD AUTO: 39.8 % (ref 34–46.6)
HGB BLD-MCNC: 13 G/DL (ref 12–15.9)
HGB UR QL STRIP.AUTO: NEGATIVE
HYALINE CASTS UR QL AUTO: ABNORMAL /LPF
IMM GRANULOCYTES # BLD AUTO: 0.02 10*3/MM3 (ref 0–0.05)
IMM GRANULOCYTES NFR BLD AUTO: 0.4 % (ref 0–0.5)
KETONES UR QL STRIP: NEGATIVE
LEUKOCYTE ESTERASE UR QL STRIP.AUTO: ABNORMAL
LYMPHOCYTES # BLD AUTO: 2.07 10*3/MM3 (ref 0.7–3.1)
LYMPHOCYTES NFR BLD AUTO: 39.4 % (ref 19.6–45.3)
MCH RBC QN AUTO: 28.6 PG (ref 26.6–33)
MCHC RBC AUTO-ENTMCNC: 32.7 G/DL (ref 31.5–35.7)
MCV RBC AUTO: 87.5 FL (ref 79–97)
MONOCYTES # BLD AUTO: 0.46 10*3/MM3 (ref 0.1–0.9)
MONOCYTES NFR BLD AUTO: 8.8 % (ref 5–12)
NEUTROPHILS # BLD AUTO: 2.47 10*3/MM3 (ref 1.7–7)
NEUTROPHILS NFR BLD AUTO: 47 % (ref 42.7–76)
NITRITE UR QL STRIP: NEGATIVE
NRBC BLD AUTO-RTO: 0 /100 WBC (ref 0–0.2)
PH UR STRIP.AUTO: 5.5 [PH] (ref 5–8)
PLATELET # BLD AUTO: 224 10*3/MM3 (ref 140–450)
PMV BLD AUTO: 11.2 FL (ref 6–12)
POTASSIUM BLD-SCNC: 4.2 MMOL/L (ref 3.5–5.2)
PROT SERPL-MCNC: 7.1 G/DL (ref 6–8.5)
PROT UR QL STRIP: NEGATIVE
RBC # BLD AUTO: 4.55 10*6/MM3 (ref 3.77–5.28)
RBC # UR: ABNORMAL /HPF
REF LAB TEST METHOD: ABNORMAL
SODIUM BLD-SCNC: 139 MMOL/L (ref 136–145)
SP GR UR STRIP: 1.02 (ref 1–1.03)
SQUAMOUS #/AREA URNS HPF: ABNORMAL /HPF
UROBILINOGEN UR QL STRIP: ABNORMAL
WBC NRBC COR # BLD: 5.25 10*3/MM3 (ref 3.4–10.8)
WBC UR QL AUTO: ABNORMAL /HPF

## 2020-05-28 PROCEDURE — 87086 URINE CULTURE/COLONY COUNT: CPT

## 2020-05-28 PROCEDURE — 80053 COMPREHEN METABOLIC PANEL: CPT

## 2020-05-28 PROCEDURE — 81001 URINALYSIS AUTO W/SCOPE: CPT

## 2020-05-28 PROCEDURE — 36415 COLL VENOUS BLD VENIPUNCTURE: CPT

## 2020-05-28 PROCEDURE — 85025 COMPLETE CBC W/AUTO DIFF WBC: CPT

## 2020-05-28 PROCEDURE — 93005 ELECTROCARDIOGRAM TRACING: CPT | Performed by: ANESTHESIOLOGY

## 2020-05-29 LAB — BACTERIA SPEC AEROBE CULT: NORMAL

## 2020-06-03 ENCOUNTER — TRANSCRIBE ORDERS (OUTPATIENT)
Dept: PHYSICAL THERAPY | Facility: CLINIC | Age: 70
End: 2020-06-03

## 2020-06-03 DIAGNOSIS — Z96.659 STATUS POST TOTAL KNEE REPLACEMENT, UNSPECIFIED LATERALITY: Primary | ICD-10-CM

## 2020-06-05 ENCOUNTER — TREATMENT (OUTPATIENT)
Dept: PHYSICAL THERAPY | Facility: CLINIC | Age: 70
End: 2020-06-05

## 2020-06-05 DIAGNOSIS — Z96.652 STATUS POST TOTAL LEFT KNEE REPLACEMENT: Primary | ICD-10-CM

## 2020-06-05 PROCEDURE — 97161 PT EVAL LOW COMPLEX 20 MIN: CPT | Performed by: PHYSICAL THERAPIST

## 2020-06-05 PROCEDURE — 97110 THERAPEUTIC EXERCISES: CPT | Performed by: PHYSICAL THERAPIST

## 2020-06-05 NOTE — PROGRESS NOTES
Physical Therapy Initial Evaluation and Plan of Care    Patient: Africa Licona   : 1950  Diagnosis/ICD-10 Code:  Status post total left knee replacement [Z96.652]  Referring practitioner: Keshawn Conrad MD  Date of Initial Visit: 2020  Today's Date: 2020  Patient seen for 1 sessions           Subjective Questionnaire: LEFS: pt to fill out next time      Subjective Evaluation    History of Present Illness  Mechanism of injury: Pt is s/p L TKR on 6/3/20. She went home yesterday and is here today to start therapy. She is using a R wx. Dressing intact. Reports she expected pain but not like this. She had therapy before DC yesterday. Has been icing her knee. Took a pain pill this morning before coming in to therapy.       Patient Occupation: retired Quality of life: good    Pain  Current pain ratin  At best pain ratin  At worst pain rating: 10  Quality: dull ache, burning and tight  Relieving factors: ice, change in position, medications and support  Aggravating factors: ambulation, prolonged positioning, standing and movement    Social Support  Lives in: one-story house  Lives with: spouse    Patient Goals  Patient goals for therapy: decreased edema, decreased pain, increased motion and increased strength           Precautions:  Post op precautions    Objective          Observations   Left Knee   Positive for edema and incision.     Additional Observation Details  Ambulating with Rwx, picking it up with ea step instead of pushing it. Step to gait pattern    Incision is covered , no drainage noted. Mod swelling      Active Range of Motion   Left Knee   Flexion: 72 degrees with pain  Extension: 8 degrees with pain    Additional Active Range of Motion Details  Measured in supine    Strength/Myotome Testing     Left Knee   Quadriceps contraction: fair    Right Knee   Normal strength          Assessment & Plan     Assessment  Impairments: abnormal gait, abnormal or restricted ROM, activity  intolerance, lacks appropriate home exercise program, pain with function and weight-bearing intolerance  Assessment details: Pt is a 68 y/o f presents to therapy s/p L TKR 2 days ago on 6/3/20.  Pt presents with limited ROM/ strength, subjective pain and activity intolerance.  She is using a rwx.   Pt exhibits the above impairments and functional limitations and would benefit from skilled Physical Therapy to address impairments , resolve pain and maximize function.   Prognosis: good  Functional Limitations: walking, uncomfortable because of pain and standing  Goals  Plan Goals: STGs: in 3 weeks    1- Pt will tolerate  initial HEP and progression of her exercise program  2- Pt will be I with initial HEP  3- Pt will reports at least 35% improvement and pain reduction  4- L knee flex will improve to 95 deg or greater  5- Pt will ambulate with a cane with fairly normal gait pattern     LTGs: by DC     1- Pt will report at least 75 % improvement and pain reduction  2- Pt will be I with final HEP and self management of her condition  3- Pt will have improved LEFS score compare to initial score indicating  improved function  4- Pt will voice readiness for DC with I program   5- L knee flex will be 105 deg or greater  6- L knee ext will be 5 deg or less  7- pt will ambulate without a.d. With fairly normal gait pattern     Plan  Therapy options: will be seen for skilled physical therapy services  Planned modality interventions: high voltage pulsed current (pain management)  Planned therapy interventions: functional ROM exercises, gait training, home exercise program, manual therapy, strengthening, stretching and therapeutic activities  Frequency: 3x week  Treatment plan discussed with: patient  Plan details: 20 visits        See flow sheet for treatment detail    History # of Personal Factors and/or Comorbidities: LOW (0)  Examination of Body System(s): # of elements: MODERATE (3)  Clinical Presentation: STABLE   Clinical  Decision Making: LOW           Timed:         Manual Therapy:         mins  84050;     Therapeutic Exercise:     28    mins  14960;     Neuromuscular Demetrius:        mins  84793;    Therapeutic Activity:          mins  03748;     Gait Training:           mins  57862;     Ultrasound:          mins  61873;    Ionto                                   mins   78188  Self Care                            mins   99903  Canal repositioning           mins    86411      Un-Timed:  Electrical Stimulation:         mins  28758 (MC );  Dry Needling          mins self-pay  Traction          mins 59004  Low Eval      20    Mins  39425  Mod Eval          Mins  45513  High Eval                            Mins  01303  Re-Eval                               mins  58664        Timed Treatment:   28   mins   Total Treatment:     58   mins    PT SIGNATURE: Alex Al PT, CLT   DATE TREATMENT INITIATED: 6/5/2020    Initial Certification  Certification Period: 9/3/2020  I certify that the therapy services are furnished while this patient is under my care.  The services outlined above are required by this patient, and will be reviewed every 90 days.     PHYSICIAN: Keshawn Conrad MD      DATE:     Please sign and return via fax to [phone].. Thank you, [facility].

## 2020-06-09 ENCOUNTER — TREATMENT (OUTPATIENT)
Dept: PHYSICAL THERAPY | Facility: CLINIC | Age: 70
End: 2020-06-09

## 2020-06-09 DIAGNOSIS — Z96.652 STATUS POST TOTAL LEFT KNEE REPLACEMENT: Primary | ICD-10-CM

## 2020-06-09 PROCEDURE — 97110 THERAPEUTIC EXERCISES: CPT | Performed by: PHYSICAL THERAPIST

## 2020-06-09 NOTE — PROGRESS NOTES
Physical Therapy Daily Progress Note    Patient: Africa Licona  : 1950  Referring practitioner: Keshawn Conrad MD  Today's Date: 2020    VISIT#: 2    Subjective   Pt reports: doing better, was able to take a shower yesterday. Taking pain med regularly. Rates pain 6/10 presently .        Objective          Active Range of Motion   Left Knee   Flexion: 82 degrees         See Exercise, Manual, and Modality Logs for complete treatment. Continued / progressed as noted.    Patient Education:    Assessment & Plan     Assessment  Assessment details: Good mata to today's treatment and progression of her ex program. Inc ROM since last visit.           Progress per Plan of Care            Timed:         Manual Therapy:         mins  03539;     Therapeutic Exercise:    40     mins  81023;     Neuromuscular Demetrius:        mins  91142;    Therapeutic Activity:          mins  01842;     Gait Training:           mins  03329;     Ultrasound:          mins  99493;    Ionto                                   mins   57400  Self Care                            mins   76754  Canal repositioning           mins    81393    Un-Timed:  Electrical Stimulation:         mins  15428 ( );  Traction          mins 48915  Low Eval          Mins  85173  Mod Eval          Mins  93913  High Eval                            Mins  44680  Re-Eval                               mins  50616    Timed Treatment:  40    mins   Total Treatment:    50    mins    Alex Al, PT, CLT  Physical Therapist

## 2020-06-10 ENCOUNTER — TREATMENT (OUTPATIENT)
Dept: PHYSICAL THERAPY | Facility: CLINIC | Age: 70
End: 2020-06-10

## 2020-06-10 DIAGNOSIS — Z96.652 STATUS POST TOTAL LEFT KNEE REPLACEMENT: Primary | ICD-10-CM

## 2020-06-10 PROCEDURE — 97110 THERAPEUTIC EXERCISES: CPT | Performed by: PHYSICAL THERAPIST

## 2020-06-10 NOTE — PROGRESS NOTES
Physical Therapy Daily Progress Note    Patient: Africa Licona  : 1950  Referring practitioner: Keshawn Conrad MD  Today's Date: 6/10/2020    VISIT#: 3      L TKR: 6/3/20    Subjective   Pt reports: she is in a lot of pain this pm. Did her exercises this morning and it never recovered. Took a pain pill before coming in but has not kicked in yet .  Rates pain 7/10 presently.      Objective     See Exercise, Manual, and Modality Logs for complete treatment.     Patient Education:    Assessment & Plan     Assessment  Assessment details: More painful today, but overall tolerated treatment well.           Progress per Plan of Care/ initiate Nustep next time            Timed:         Manual Therapy:         mins  39390;     Therapeutic Exercise:     35    mins  08941;     Neuromuscular Demetrius:        mins  71112;    Therapeutic Activity:          mins  64264;     Gait Training:           mins  16402;     Ultrasound:          mins  15518;    Ionto                                   mins   19028  Self Care                            mins   66063  Canal repositioning           mins    96572    Un-Timed:  Electrical Stimulation:         mins  03117 ( );  Traction          mins 40002  Low Eval          Mins  59888  Mod Eval          Mins  44820  High Eval                            Mins  10209  Re-Eval                               mins  57114    Timed Treatment:  35    mins   Total Treatment:     45   mins    Alex Al PT, CLT  Physical Therapist

## 2020-06-11 PROCEDURE — 93010 ELECTROCARDIOGRAM REPORT: CPT | Performed by: INTERNAL MEDICINE

## 2020-06-12 ENCOUNTER — TREATMENT (OUTPATIENT)
Dept: PHYSICAL THERAPY | Facility: CLINIC | Age: 70
End: 2020-06-12

## 2020-06-12 DIAGNOSIS — Z96.652 STATUS POST TOTAL LEFT KNEE REPLACEMENT: Primary | ICD-10-CM

## 2020-06-12 PROCEDURE — 97110 THERAPEUTIC EXERCISES: CPT | Performed by: PHYSICAL THERAPIST

## 2020-06-12 RX ORDER — FLUOXETINE HYDROCHLORIDE 20 MG/1
CAPSULE ORAL
Qty: 90 CAPSULE | Refills: 1 | Status: SHIPPED | OUTPATIENT
Start: 2020-06-12 | End: 2020-11-24

## 2020-06-12 NOTE — PROGRESS NOTES
Physical Therapy Daily Progress Note    Patient: Africa Licona  : 1950  Referring practitioner: Keshanw Conrad MD  Today's Date: 2020    VISIT#: 4    Subjective   Pt reports: doing a lot better this morning compare to last time she was here. Rates pain /10 presently. Slept in her bed last night instead of the lift chair.  Was able to sleep better.       Objective          Active Range of Motion   Left Knee   Flexion: 85 degrees     Additional Active Range of Motion Details  Measured in sup        See Exercise, Manual, and Modality Logs for complete treatment. Cont/ progressed with there ex as noted.      Patient Education:    Assessment & Plan     Assessment  Assessment details: Good mata to today's treatment and progression of her ex program. Tolerating progression of her ex program well and making steady progress.           Progress per Plan of Care            Timed:         Manual Therapy:         mins  81325;     Therapeutic Exercise:    40     mins  42218;     Neuromuscular Demetrius:        mins  66442;    Therapeutic Activity:          mins  43918;     Gait Training:           mins  65823;     Ultrasound:          mins  54831;    Ionto                                   mins   96979  Self Care                            mins   80519  Canal repositioning           mins    02278    Un-Timed:  Electrical Stimulation:         mins  35889 ( );  Traction          mins 29179  Low Eval          Mins  09453  Mod Eval          Mins  21813  High Eval                            Mins  63613  Re-Eval                               mins  59592    Timed Treatment:  40    mins   Total Treatment:     50   mins    Alex Al PT, CLT  Physical Therapist

## 2020-06-16 ENCOUNTER — TREATMENT (OUTPATIENT)
Dept: PHYSICAL THERAPY | Facility: CLINIC | Age: 70
End: 2020-06-16

## 2020-06-16 DIAGNOSIS — Z96.652 STATUS POST TOTAL LEFT KNEE REPLACEMENT: Primary | ICD-10-CM

## 2020-06-16 PROCEDURE — 97110 THERAPEUTIC EXERCISES: CPT | Performed by: PHYSICAL THERAPIST

## 2020-06-16 NOTE — PROGRESS NOTES
Physical Therapy Daily Progress Note    Patient: Africa Licona  : 1950  Referring practitioner: Keshawn Conrad MD  Today's Date: 2020    VISIT#: 5    Subjective   Pt reports: doing ok this morning, rates pain 5/10 presently. Has been using a cane mostly in the past couple of day.       Objective          Active Range of Motion   Left Knee   Flexion: 93 degrees   Extension: 5 degrees     Additional Active Range of Motion Details  Measured in supine        See Exercise, Manual, and Modality Logs for complete treatment. Cont'd/ progressed with there ex as noted. Does well with cane, requires vcs for proper gait pattern, hip/knee flex and heel strike. Concluded with application of CP.    Patient Education:    Assessment & Plan     Assessment  Assessment details: Responding well to therapy, ROM improving. Able to ambulate with st cane. Making steady progress. Making progress towards meeting STGs.     Goals  Plan Goals: Plan Goals: STGs: in 3 weeks    1- Pt will tolerate  initial HEP and progression of her exercise program  2- Pt will be I with initial HEP  3- Pt will reports at least 35% improvement and pain reduction  4- L knee flex will improve to 95 deg or greater  5- Pt will ambulate with a cane with fairly normal gait pattern     LTGs: by DC     1- Pt will report at least 75 % improvement and pain reduction  2- Pt will be I with final HEP and self management of her condition  3- Pt will have improved LEFS score compare to initial score indicating  improved function  4- Pt will voice readiness for DC with I program   5- L knee flex will be 105 deg or greater  6- L knee ext will be 5 deg or less  7- pt will ambulate without a.d. With fairly normal gait pattern            Progress per Plan of Care            Timed:         Manual Therapy:         mins  03281;     Therapeutic Exercise:   45      mins  18890;     Neuromuscular Demetrius:        mins  36389;    Therapeutic Activity:          mins  43122;      Gait Training:           mins  42470;     Ultrasound:          mins  23574;    Ionto                                   mins   46036  Self Care                            mins   14033  Canal repositioning           mins    19892    Un-Timed:  Electrical Stimulation:         mins  72760 ( );  Traction          mins 75416  Low Eval          Mins  71809  Mod Eval          Mins  02765  High Eval                            Mins  24094  Re-Eval                               mins  47441    Timed Treatment:   45   mins   Total Treatment:     55   mins    Alex Al PT, CLT  Physical Therapist

## 2020-06-19 ENCOUNTER — TREATMENT (OUTPATIENT)
Dept: PHYSICAL THERAPY | Facility: CLINIC | Age: 70
End: 2020-06-19

## 2020-06-19 DIAGNOSIS — Z96.652 STATUS POST TOTAL LEFT KNEE REPLACEMENT: Primary | ICD-10-CM

## 2020-06-19 PROCEDURE — 97110 THERAPEUTIC EXERCISES: CPT | Performed by: PHYSICAL THERAPIST

## 2020-06-19 NOTE — PROGRESS NOTES
Physical Therapy Daily Progress Note    Patient: Africa Licona  : 1950  Referring practitioner: Keshawn Conrad MD  Today's Date: 2020    VISIT#: 6    Subjective   Pt reports: doing pretty well, getting around a little better. Rates pain 2/10 presently. Has been using a cane for the past several days.       Objective          Active Range of Motion   Left Knee   Flexion: 102 degrees   Extension: 5 degrees     Additional Active Range of Motion Details  Measured in sup        See Exercise, Manual, and Modality Logs for complete treatment. cont'd / progressed with there ex as noted. Concluded with application of CP.     Patient Education:    Assessment & Plan     Assessment  Assessment details: Making steady progress with inc ROM and improved gait and general mobility. Ambulating with st cane. Good mata to today's treatment and progression of her ex program.                Progress per Plan of Care            Timed:         Manual Therapy:         mins  97107;     Therapeutic Exercise:    40     mins  31379;     Neuromuscular Demetrius:        mins  81194;    Therapeutic Activity:          mins  45561;     Gait Training:           mins  47701;     Ultrasound:          mins  97953;    Ionto                                   mins   80139  Self Care                            mins   20300  Canal repositioning           mins    99621    Un-Timed:  Electrical Stimulation:         mins  65259 ( );  Traction          mins 57048  Low Eval          Mins  24211  Mod Eval          Mins  13241  High Eval                            Mins  76965  Re-Eval                               mins  33288    Timed Treatment:  40    mins   Total Treatment:    50    mins    Alex Al, PT, CLT  Physical Therapist

## 2020-06-23 ENCOUNTER — TREATMENT (OUTPATIENT)
Dept: PHYSICAL THERAPY | Facility: CLINIC | Age: 70
End: 2020-06-23

## 2020-06-23 DIAGNOSIS — Z96.652 STATUS POST TOTAL LEFT KNEE REPLACEMENT: Primary | ICD-10-CM

## 2020-06-23 PROCEDURE — 97110 THERAPEUTIC EXERCISES: CPT | Performed by: PHYSICAL THERAPIST

## 2020-06-23 NOTE — PROGRESS NOTES
Physical Therapy Daily Progress Note    Patient: Africa Licona  : 1950  Referring practitioner: Keshawn Conrad MD  Today's Date: 2020    VISIT#: 7    Subjective   Pt reports: she fell yesterday at home, fell on her knee and it has been very sore since. Iced it a few times yesterday. Didn't call the Dr. Has a f/u appt on Thursday. Rates pain 7/10 presently.       Objective     See Exercise, Manual, and Modality Logs for complete treatment.   Mod TTP lateral aspects of L knee, noticeable gait deviation with st cane  L knee flex: only 52 deg today due to inc pain  circ measurements: 41 cm at mid jt  Suggested to use her rwx if inc pain with gait    Held some of the exercises today due to inc pain    Patient Education: instructed to ice it several time a day, take her pain pill and perf gentle ROM exercises. Pt to call the Dr and see if they want her in sooner than Thursday.     Assessment & Plan     Assessment  Assessment details: Pt presents with inc pain and dec ROM today, gait deviation due to inc pain since her fall yesterday. Recommended to call the Dr , she has a f/u appt on Thursday.           Progress per Plan of Care            Timed:         Manual Therapy:         mins  04005;     Therapeutic Exercise:   34     mins  56248;     Neuromuscular Demetrius:        mins  09163;    Therapeutic Activity:          mins  21646;     Gait Training:           mins  79290;     Ultrasound:          mins  69911;    Ionto                                   mins   81373  Self Care                            mins   45456  Canal repositioning           mins    15132    Un-Timed:  Electrical Stimulation:         mins  51182 ( );  Traction          mins 34498  Low Eval          Mins  15869  Mod Eval          Mins  62099  High Eval                            Mins  70600  Re-Eval                               mins  98281    Timed Treatment:   34   mins   Total Treatment:    44    mins    Alex lA PT,  CLT  Physical Therapist

## 2020-06-24 ENCOUNTER — TREATMENT (OUTPATIENT)
Dept: PHYSICAL THERAPY | Facility: CLINIC | Age: 70
End: 2020-06-24

## 2020-06-24 DIAGNOSIS — Z96.652 STATUS POST TOTAL LEFT KNEE REPLACEMENT: Primary | ICD-10-CM

## 2020-06-24 PROCEDURE — 97110 THERAPEUTIC EXERCISES: CPT | Performed by: PHYSICAL THERAPIST

## 2020-06-24 NOTE — PROGRESS NOTES
Physical Therapy Progress Note    Patient: Africa Licona  : 1950  Referring practitioner: Keshawn Conrad MD  Today's Date: 2020    VISIT#: 8    Subjective   Pt reports: doing a lot better today, took a pain pill yesterday and started feeling better. Moving it a lot better. Call the Dr's office and they said just wait till appt on Thursday. Rates pain 2/10 presently. Has been using her wx again for more support.       Objective          Active Range of Motion   Left Knee   Flexion: 95 degrees   Extension: 5 degrees     Additional Active Range of Motion Details  Measured in supine        See Exercise, Manual, and Modality Logs for complete treatment. Continued with there ex as noted. Improved mata to treatment today compare to yesterday. Dec pain and improved ROM.     Patient Education:    Assessment & Plan     Assessment  Assessment details: Good mata to today's treatment. Dec pain and improved ROM today compare to yesterday since her fall on Monday.  Overall doing well and making steady progress. Was ambulating with st cane but has used the wx since yesterday due to inc pain.  4/5 STGs met so far, making steady progress towards LTGs.   LEFS:     Goals  Plan Goals: Plan Goals: Plan Goals: STGs: in 3 weeks     1- Pt will tolerate  initial HEP and progression of her exercise program ( met )  2- Pt will be I with initial HEP ( met )  3- Pt will reports at least 35% improvement and pain reduction ( met )  4- L knee flex will improve to 95 deg or greater ( )  5- Pt will ambulate with a cane with fairly normal gait pattern     LTGs: by DC     1- Pt will report at least 75 % improvement and pain reduction  2- Pt will be I with final HEP and self management of her condition  3- Pt will have improved LEFS score compare to initial score indicating  improved function  4- Pt will voice readiness for DC with I program   5- L knee flex will be 105 deg or greater  6- L knee ext will be 5 deg or  less  7- pt will ambulate without a.d. With fairly normal gait pattern         Plan  Plan details: F/U appt tomorrow to take the staples out. Recommend cont PT to max function.           Progress per Plan of Care            Timed:         Manual Therapy:         mins  90734;     Therapeutic Exercise:    40    mins  58764;     Neuromuscular Demetrius:        mins  77687;    Therapeutic Activity:          mins  06257;     Gait Training:           mins  20366;     Ultrasound:          mins  73961;    Ionto                                   mins   74369  Self Care                            mins   29606  Canal repositioning           mins    04249    Un-Timed:  Electrical Stimulation:         mins  06369 ( );  Traction          mins 58031  Low Eval          Mins  20186  Mod Eval          Mins  88773  High Eval                            Mins  86122  Re-Eval                               mins  73062    Timed Treatment:   40   mins   Total Treatment:     50   mins    Alex Al, PT, CLT  Physical Therapist

## 2020-06-26 ENCOUNTER — TREATMENT (OUTPATIENT)
Dept: PHYSICAL THERAPY | Facility: CLINIC | Age: 70
End: 2020-06-26

## 2020-06-26 DIAGNOSIS — Z96.652 STATUS POST TOTAL LEFT KNEE REPLACEMENT: Primary | ICD-10-CM

## 2020-06-26 PROCEDURE — 97110 THERAPEUTIC EXERCISES: CPT | Performed by: PHYSICAL THERAPIST

## 2020-06-26 NOTE — PROGRESS NOTES
Physical Therapy Daily Progress Note    Patient: Africa Licona  : 1950  Referring practitioner: Keshawn Conrad MD  Today's Date: 2020    VISIT#: 9     L TKR 6/3/20     Next f/u visit 20    Subjective   Pt reports: doing pretty well , mild pain is reported no more than 2-3/10.  Saw the Dr yesterday everything is ok, x-rays looked fine, they removed the staples. Next f/u appt Aug 27.       Objective     See Exercise, Manual, and Modality Logs for complete treatment. cont'd / progressed with there ex as noted.      Patient Education:    Assessment & Plan     Assessment  Assessment details: Good mata to today's treatment and progression of her ex program. Making steady progress. Still requires vcs for gait pattern and knee/ hip flex during gait. Making progress towards LTGs.           Progress per Plan of Care            Timed:         Manual Therapy:         mins  45167;     Therapeutic Exercise:    45     mins  63385;     Neuromuscular Demetrius:        mins  72445;    Therapeutic Activity:          mins  38503;     Gait Training:           mins  28744;     Ultrasound:          mins  83567;    Ionto                                   mins   34817  Self Care                            mins   81013  Canal repositioning           mins    28769    Un-Timed:  Electrical Stimulation:         mins  62571 ( );  Traction          mins 58384  Low Eval          Mins  09409  Mod Eval          Mins  95684  High Eval                            Mins  87501  Re-Eval                               mins  57153    Timed Treatment:   45   mins   Total Treatment:    55    mins    Alex Al, PT, CLT  Physical Therapist

## 2020-06-30 ENCOUNTER — TREATMENT (OUTPATIENT)
Dept: PHYSICAL THERAPY | Facility: CLINIC | Age: 70
End: 2020-06-30

## 2020-06-30 DIAGNOSIS — Z96.652 STATUS POST TOTAL LEFT KNEE REPLACEMENT: Primary | ICD-10-CM

## 2020-06-30 PROCEDURE — 97110 THERAPEUTIC EXERCISES: CPT | Performed by: PHYSICAL THERAPIST

## 2020-06-30 NOTE — PROGRESS NOTES
Physical Therapy  Progress Note    Patient: Africa Licona  : 1950  Referring practitioner: Keshawn Conrad MD  Today's Date: 2020    VISIT#: 10     L TKR 6/3/20     Next f/u visit 20    Subjective   Pt reports: doing pretty well this morning. Rates pain no more than a 2/10 this morning. It kept her up last night though. Over did it yesterday. Did a lot of walking and did her exercises a few times. Reports at least 50% improvement so far. She drove this morning.         Objective          Active Range of Motion   Left Knee   Flexion: 106 degrees   Extension: 4 degrees     Additional Active Range of Motion Details  Measured in supine        See Exercise, Manual, and Modality Logs for complete treatment.     Came in without a.d. This morning. Reports still use her cane if has to walk too far but doesn't use it at home.     Patient Education:    Assessment & Plan     Assessment  Assessment details: Good tolerance to today's treatment and progression of her ex program. Making steady progress with inc ROM/ strength and inc mata to walking and physical activities. Ambulating with st cane for long distance and without a.d for short distance.  Reports at least 50% improvement so far. All STGs met making steady progress towards LTGs. Will benefit from cont therapy to max function and return to normal activities.   LEFS:      Goals  Plan Goals: Plan Goals: Plan Goals: Plan Goals: STGs: in 3 weeks     1- Pt will tolerate  initial HEP and progression of her exercise program ( met )  2- Pt will be I with initial HEP ( met )  3- Pt will reports at least 35% improvement and pain reduction ( met )  4- L knee flex will improve to 95 deg or greater ( )  5- Pt will ambulate with a cane with fairly normal gait pattern ( met )     LTGs: by DC     1- Pt will report at least 75 % improvement and pain reduction  2- Pt will be I with final HEP and self management of her condition  3- Pt will have  improved LEFS score compare to initial score indicating  improved function  4- Pt will voice readiness for DC with I program   5- L knee flex will be 105 deg or greater  6- L knee ext will be 5 deg or less  7- pt will ambulate without a.d. With fairly normal gait pattern        Plan  Frequency: 2x week  Treatment plan discussed with: patient          Progress per Plan of Care            Timed:         Manual Therapy:         mins  30620;     Therapeutic Exercise:   46      mins  92502;     Neuromuscular Demetrius:        mins  79901;    Therapeutic Activity:          mins  12035;     Gait Training:           mins  13949;     Ultrasound:          mins  31518;    Ionto                                   mins   32771  Self Care                            mins   10040  Canal repositioning           mins    02957    Un-Timed:  Electrical Stimulation:         mins  90527 ( );  Traction          mins 43534  Low Eval          Mins  79790  Mod Eval          Mins  24955  High Eval                            Mins  54628  Re-Eval                               mins  47075    Timed Treatment:  46    mins   Total Treatment:    46    mins    Alex Al, PT, CLT  Physical Therapist

## 2020-07-02 ENCOUNTER — TREATMENT (OUTPATIENT)
Dept: PHYSICAL THERAPY | Facility: CLINIC | Age: 70
End: 2020-07-02

## 2020-07-02 DIAGNOSIS — Z96.652 STATUS POST TOTAL LEFT KNEE REPLACEMENT: Primary | ICD-10-CM

## 2020-07-02 PROCEDURE — 97110 THERAPEUTIC EXERCISES: CPT | Performed by: PHYSICAL THERAPIST

## 2020-07-02 NOTE — PROGRESS NOTES
Physical Therapy Daily Progress Note    Patient: Africa Licona  : 1950  Referring practitioner: Keshawn Conrad MD  Today's Date: 2020    VISIT#: 11     L TKR 6/3/20     Next f/u visit 20      Subjective   Pt reports: running a little late today. Overall doing pretty well. Doesn't take any pain pills anymore just IBP as needed. Denies any pain this morning, just stiffness.       Objective          Active Range of Motion   Left Knee   Flexion: 110 degrees   Extension: 4 degrees     Additional Active Range of Motion Details  supine        See Exercise, Manual, and Modality Logs for complete treatment.     Patient Education:    Assessment & Plan     Assessment  Assessment details: Good mata to today's treatment and progression of her ex program. Making steady progress .            Progress per Plan of Care            Timed:         Manual Therapy:         mins  94599;     Therapeutic Exercise:   50      mins  74654;     Neuromuscular Demetrius:        mins  82050;    Therapeutic Activity:          mins  74830;     Gait Training:           mins  78805;     Ultrasound:          mins  58739;    Ionto                                   mins   69529  Self Care                            mins   63553  Canal repositioning           mins    85970    Un-Timed:  Electrical Stimulation:         mins  83659 ( );  Traction          mins 71558  Low Eval          Mins  12312  Mod Eval          Mins  27631  High Eval                            Mins  04855  Re-Eval                               mins  72255    Timed Treatment:  50   mins   Total Treatment:    50    mins    Alex Al PT, CLT  Physical Therapist

## 2020-07-07 ENCOUNTER — TREATMENT (OUTPATIENT)
Dept: PHYSICAL THERAPY | Facility: CLINIC | Age: 70
End: 2020-07-07

## 2020-07-07 DIAGNOSIS — Z96.652 STATUS POST TOTAL LEFT KNEE REPLACEMENT: Primary | ICD-10-CM

## 2020-07-07 PROCEDURE — 97110 THERAPEUTIC EXERCISES: CPT | Performed by: PHYSICAL THERAPIST

## 2020-07-07 NOTE — PROGRESS NOTES
Physical Therapy Daily Progress Note    Patient: Africa Licona  : 1950  Referring practitioner: No ref. provider found  Today's Date: 2020    VISIT#: 12     L TKR 6/3/20     Next f/u visit 20      Subjective   Pt reports: doing pretty good, was not taking any pain meds but realized she was having more pain during the day. Took a pain pill last night and it helped her sleep better and feels better this morning.       Objective     See Exercise, Manual, and Modality Logs for complete treatment. cont'd / progressed with there ex as noted. Making steady progress.     Patient Education:    Assessment & Plan     Assessment  Assessment details: Good mata to today's treatment and progression of her ex program. Making steady progress with inc ROM/ strength and improved gait.           Progress per Plan of Care            Timed:         Manual Therapy:         mins  38611;     Therapeutic Exercise:    50     mins  13387;     Neuromuscular Demetrius:        mins  71428;    Therapeutic Activity:         mins  08339;     Gait Training:           mins  37504;     Ultrasound:          mins  69664;    Ionto                                   mins   66172  Self Care                            mins   55673  Canal repositioning           mins    64469    Un-Timed:  Electrical Stimulation:         mins  52205 ( );  Traction          mins 80684  Low Eval          Mins  20732  Mod Eval          Mins  97432  High Eval                            Mins  93855  Re-Eval                               mins  15238    Timed Treatment:  50    mins   Total Treatment:    60    mins    Alex Al, PT, CLT  Physical Therapist

## 2020-07-09 ENCOUNTER — TREATMENT (OUTPATIENT)
Dept: PHYSICAL THERAPY | Facility: CLINIC | Age: 70
End: 2020-07-09

## 2020-07-09 DIAGNOSIS — Z96.652 STATUS POST TOTAL LEFT KNEE REPLACEMENT: Primary | ICD-10-CM

## 2020-07-09 PROCEDURE — 97110 THERAPEUTIC EXERCISES: CPT | Performed by: PHYSICAL THERAPIST

## 2020-07-09 NOTE — PROGRESS NOTES
Physical Therapy Daily Progress Note    Patient: Africa Licona  : 1950  Referring practitioner: Keshawn Conrad MD  Today's Date: 2020    VISIT#: 13     L TKR 6/3/20     Next f/u visit 20      Subjective   Pt reports: doing pretty well this morning, taking a pain pill at night helps her to sleep better and feels better the next day. Takes IBP during the day as needed. Has been walking without the cane for the past few days except when she is walking around the yard.       Objective          Active Range of Motion   Left Knee   Flexion: 116 degrees   Extension: 3 degrees     Additional Active Range of Motion Details  In supine        See Exercise, Manual, and Modality Logs for complete treatment.     Patient Education: instructed in scar massage    Assessment & Plan     Assessment  Assessment details: Good mata to today's treatment and progression of her ex program today. Making steady progress with inc ROM and improved gait and general mobility.           Progress per Plan of Care            Timed:         Manual Therapy:         mins  02655;     Therapeutic Exercise:   55      mins  76255;     Neuromuscular Demetrius:        mins  46127;    Therapeutic Activity:          mins  89218;     Gait Training:           mins  70602;     Ultrasound:          mins  93200;    Ionto                                   mins   88279  Self Care                            mins   01140  Canal repositioning           mins    23895    Un-Timed:  Electrical Stimulation:         mins  11091 ( );  Traction          mins 15155  Low Eval          Mins  04674  Mod Eval          Mins  64819  High Eval                            Mins  87344  Re-Eval                               mins  19414    Timed Treatment:   55   mins   Total Treatment:    60    mins    Alex Al, PT, CLT  Physical Therapist

## 2020-07-14 ENCOUNTER — TREATMENT (OUTPATIENT)
Dept: PHYSICAL THERAPY | Facility: CLINIC | Age: 70
End: 2020-07-14

## 2020-07-14 DIAGNOSIS — Z96.652 STATUS POST TOTAL LEFT KNEE REPLACEMENT: Primary | ICD-10-CM

## 2020-07-14 PROCEDURE — 97110 THERAPEUTIC EXERCISES: CPT | Performed by: PHYSICAL THERAPIST

## 2020-07-14 NOTE — PROGRESS NOTES
Physical Therapy Daily Progress Note    Patient: Africa Licona  : 1950  Referring practitioner: Keshawn Conrad MD  Today's Date: 2020    VISIT#: 14     L TKR 6/3/20     Next f/u visit 20      Subjective   Pt reports: doing ok this morning, she was walking on grass yesterday and twisted her knee was pretty sore afterwards. Still a little sore this morning.       Objective     See Exercise, Manual, and Modality Logs for complete treatment. Continued/ progressed with there ex as noted.     Patient Education:    Assessment & Plan     Assessment  Assessment details: Good mata to today's treatment and progression of her ex program. Making steady progress. Ambulating without a.d. With dec knee/ hip flex, improves with practice.           Progress per Plan of Care            Timed:         Manual Therapy:         mins  54732;     Therapeutic Exercise:     60    mins  94684;     Neuromuscular Demetrius:        mins  66591;    Therapeutic Activity:          mins  99109;     Gait Training:           mins  98118;     Ultrasound:         mins  68251;    Ionto                                   mins   56498  Self Care                            mins   87915  Canal repositioning           mins    61998    Un-Timed:  Electrical Stimulation:         mins  63135 ( );  Traction          mins 74557  Low Eval          Mins  15422  Mod Eval          Mins  42056  High Eval                            Mins  24925  Re-Eval                               mins  74366    Timed Treatment: 60     mins   Total Treatment:     60   mins    Alex Al PT, CLT  Physical Therapist

## 2020-07-16 ENCOUNTER — TREATMENT (OUTPATIENT)
Dept: PHYSICAL THERAPY | Facility: CLINIC | Age: 70
End: 2020-07-16

## 2020-07-16 DIAGNOSIS — Z96.652 STATUS POST TOTAL LEFT KNEE REPLACEMENT: Primary | ICD-10-CM

## 2020-07-16 PROCEDURE — 97110 THERAPEUTIC EXERCISES: CPT | Performed by: PHYSICAL THERAPIST

## 2020-07-16 NOTE — PROGRESS NOTES
Physical Therapy Daily Progress Note    Patient: Africa Licona  : 1950  Referring practitioner: Keshawn Conrad MD  Today's Date: 2020    VISIT#: 15     L TKR 6/3/20     Next f/u visit 20    Subjective   Pt reports: doing pretty well, walk without the cane most of the time except for when walks on grass. Denies any pain presently.       Objective          Active Range of Motion   Left Knee   Flexion: 118 degrees   Extension: 4 degrees         See Exercise, Manual, and Modality Logs for complete treatment. Continued/ progressed with there ex as noted.     Patient Education:    Assessment & Plan     Assessment  Assessment details: Making steady progress with inc ROM/ strength and improved gait pattern. Good mata to today's treatment and progression of her ex program.           Progress per Plan of Care            Timed:         Manual Therapy:         mins  28524;     Therapeutic Exercise:     54    mins  99477;     Neuromuscular Demetrius:        mins  14070;    Therapeutic Activity:          mins  20691;     Gait Training:           mins  15153;     Ultrasound:          mins  77069;    Ionto                                   mins   74695  Self Care                            mins   71927  Canal repositioning           mins    87714    Un-Timed:  Electrical Stimulation:         mins  97157 ( );  Traction          mins 16017  Low Eval          Mins  06464  Mod Eval          Mins  08166  High Eval                            Mins  79957  Re-Eval                               mins  82941    Timed Treatment:  54    mins   Total Treatment:   60     mins    Alex Al PT, CLT  Physical Therapist

## 2020-07-21 ENCOUNTER — TREATMENT (OUTPATIENT)
Dept: PHYSICAL THERAPY | Facility: CLINIC | Age: 70
End: 2020-07-21

## 2020-07-21 DIAGNOSIS — Z96.652 STATUS POST TOTAL LEFT KNEE REPLACEMENT: Primary | ICD-10-CM

## 2020-07-21 PROCEDURE — 97110 THERAPEUTIC EXERCISES: CPT | Performed by: PHYSICAL THERAPIST

## 2020-07-21 NOTE — PROGRESS NOTES
Physical Therapy Daily Progress Note    Patient: Africa Licona  : 1950  Referring practitioner: Keshawn Conrad MD  Today's Date: 2020    VISIT#: 16    Subjective     L TKR 6/3/20     Next f/u visit 20    Pt reports: doing pretty well,  started warm water aquatic yesterday it felt really good. Denies any pain this morning.       Objective          Active Range of Motion   Left Knee   Flexion: 121 degrees   Extension: 3 degrees         See Exercise, Manual, and Modality Logs for complete treatment.     Patient Education:    Assessment & Plan     Assessment  Assessment details: Good mata to today's treatment and progression of her ex program. Making steady progress. All STGs met, 3/ LTGs met making progress towards remaining goals.     Goals  Plan Goals: Goals  Plan Goals: Plan Goals: Plan Goals: Plan Goals: STGs: in 3 weeks     1- Pt will tolerate  initial HEP and progression of her exercise program ( met )  2- Pt will be I with initial HEP ( met )  3- Pt will reports at least 35% improvement and pain reduction ( met )  4- L knee flex will improve to 95 deg or greater ( )  5- Pt will ambulate with a cane with fairly normal gait pattern ( met )     LTGs: by DC     1- Pt will report at least 75 % improvement and pain reduction  2- Pt will be I with final HEP and self management of her condition  3- Pt will have improved LEFS score compare to initial score indicating  improved function  4- Pt will voice readiness for DC with I program   5- L knee flex will be 105 deg or greater ( met )  6- L knee ext will be 5 deg or less (  Met )  7- pt will ambulate without a.d. With fairly normal gait pattern ( met )          Progress per Plan of Care            Timed:         Manual Therapy:         mins  91699;     Therapeutic Exercise:   54      mins  67755;     Neuromuscular Demetrius:        mins  71247;    Therapeutic Activity:          mins  51269;     Gait Training:            mins  08359;     Ultrasound:          mins  68444;    Ionto                                   mins   74930  Self Care                            mins   69880  Canal repositioning           mins    06418    Un-Timed:  Electrical Stimulation:         mins  55596 ( );  Traction          mins 67016  Low Eval          Mins  27307  Mod Eval          Mins  01048  High Eval                            Mins  58317  Re-Eval                               mins  88784    Timed Treatment:   54   mins   Total Treatment:    59    mins    Alex Al PT, CLT  Physical Therapist

## 2020-07-23 ENCOUNTER — TREATMENT (OUTPATIENT)
Dept: PHYSICAL THERAPY | Facility: CLINIC | Age: 70
End: 2020-07-23

## 2020-07-23 DIAGNOSIS — Z96.652 STATUS POST TOTAL LEFT KNEE REPLACEMENT: Primary | ICD-10-CM

## 2020-07-23 PROCEDURE — 97110 THERAPEUTIC EXERCISES: CPT | Performed by: PHYSICAL THERAPIST

## 2020-07-23 NOTE — PROGRESS NOTES
Physical Therapy Daily Progress Note    Patient: Africa Licona  : 1950  Referring practitioner: Keshawn Conrad MD  Today's Date: 2020    VISIT#: 17     L TKR 6/3/20     Next f/u visit 20    Subjective   Pt reports: doing pretty well this morning. Denies any pain presently. Feels she is making progress and sees improvement every day.       Objective     See Exercise, Manual, and Modality Logs for complete treatment.     Patient Education:    Assessment & Plan     Assessment  Assessment details: Good mata to today's treatment . Making steady progress with inc ROM/ strength and inc tolerance to physical activities.           Progress per Plan of Care            Timed:         Manual Therapy:         mins  00922;     Therapeutic Exercise:     54    mins  37763;     Neuromuscular Demetrius:        mins  57643;    Therapeutic Activity:          mins  25423;     Gait Training:           mins  57036;     Ultrasound:          mins  26922;    Ionto                                   mins   52308  Self Care                            mins   95646  Canal repositioning           mins    71770    Un-Timed:  Electrical Stimulation:         mins  84426 ( );  Traction          mins 91097  Low Eval          Mins  56374  Mod Eval          Mins  24281  High Eval                            Mins  72084  Re-Eval                               mins  17455    Timed Treatment: 54     mins   Total Treatment:    54    mins    Alex Al PT, CLT  Physical Therapist

## 2020-07-28 ENCOUNTER — TREATMENT (OUTPATIENT)
Dept: PHYSICAL THERAPY | Facility: CLINIC | Age: 70
End: 2020-07-28

## 2020-07-28 DIAGNOSIS — Z96.652 STATUS POST TOTAL LEFT KNEE REPLACEMENT: Primary | ICD-10-CM

## 2020-07-28 PROCEDURE — 97110 THERAPEUTIC EXERCISES: CPT | Performed by: PHYSICAL THERAPIST

## 2020-07-28 NOTE — PROGRESS NOTES
Physical Therapy Daily Progress Note    Patient: Africa Licona  : 1950  Referring practitioner: Keshawn Conrad MD  Today's Date: 2020    VISIT#: 18     L TKR 6/3/20     Next f/u visit 20    Subjective   Pt reports: she is doing well, making progress and feels she is getting stronger and improving on daily basis.       Objective          Active Range of Motion   Left Knee   Flexion: 122 degrees   Extension: 0 degrees         See Exercise, Manual, and Modality Logs for complete treatment. Continued with there ex as noted.     Patient Education:    Assessment & Plan     Assessment  Assessment details: Good mata to today's treatment and progression of her ex program. Subjective improvement is reported. Improved gait and general strength and mobility.           Progress per Plan of Care            Timed:         Manual Therapy:         mins  50452;     Therapeutic Exercise:    60     mins  40328;     Neuromuscular Demetrius:        mins  97973;    Therapeutic Activity:          mins  86463;     Gait Training:           mins  02341;     Ultrasound:          mins  39250;    Ionto                                   mins   13364  Self Care                            mins   96757  Canal repositioning           mins    21353    Un-Timed:  Electrical Stimulation:         mins  05845 ( );  Traction          mins 77561  Low Eval          Mins  15278  Mod Eval          Mins  36008  High Eval                            Mins  58749  Re-Eval                               mins  28560    Timed Treatment:   60   mins   Total Treatment:     60   mins    Alex Al PT, CLT  Physical Therapist

## 2020-08-04 ENCOUNTER — TREATMENT (OUTPATIENT)
Dept: PHYSICAL THERAPY | Facility: CLINIC | Age: 70
End: 2020-08-04

## 2020-08-04 DIAGNOSIS — Z96.652 STATUS POST TOTAL LEFT KNEE REPLACEMENT: Primary | ICD-10-CM

## 2020-08-04 PROCEDURE — 97110 THERAPEUTIC EXERCISES: CPT | Performed by: PHYSICAL THERAPIST

## 2020-08-04 NOTE — PROGRESS NOTES
Physical Therapy Daily Progress Note    Patient: Africa Licona  : 1950  Referring practitioner: Keshawn Conrad MD  Today's Date: 2020    VISIT#: 19     L TKR 6/3/20     Next f/u visit 20    Subjective   Pt reports: doing well, making steady progress. Pain is very mild and under control. Walking without inc pain or any difficulties. Her knee still gets stiff but overall very pleased with her progress and out come of the surgery.       Objective          Active Range of Motion   Left Knee   Flexion: 128 degrees   Extension: 0 degrees         See Exercise, Manual, and Modality Logs for complete treatment.     Patient Education:    Assessment & Plan     Assessment  Assessment details: Good mata to today's treatment. Continues to improved with inc ROM/ strength , improved gait and overall function. All STGs met, 3/7 LTGs met.     Goals  Plan Goals: Plan Goals: STGs: in 3 weeks     1- Pt will tolerate  initial HEP and progression of her exercise program ( met )  2- Pt will be I with initial HEP ( met )  3- Pt will reports at least 35% improvement and pain reduction ( met )  4- L knee flex will improve to 95 deg or greater ( )  5- Pt will ambulate with a cane with fairly normal gait pattern ( met )     LTGs: by DC     1- Pt will report at least 75 % improvement and pain reduction  2- Pt will be I with final HEP and self management of her condition  3- Pt will have improved LEFS score compare to initial score indicating  improved function  4- Pt will voice readiness for DC with I program   5- L knee flex will be 105 deg or greater ( met )  6- L knee ext will be 5 deg or less (met )  7- pt will ambulate without a.d. With fairly normal gait pattern ( met )          Progress per Plan of Care            Timed:         Manual Therapy:         mins  01891;     Therapeutic Exercise:    50     mins  67801;     Neuromuscular Demetrius:        mins  78529;    Therapeutic Activity:           mins  26902;     Gait Training:           mins  46395;     Ultrasound:          mins  33590;    Ionto                                   mins   33788  Self Care                            mins   54895  Canal repositioning           mins    85973    Un-Timed:  Electrical Stimulation:         mins  67085 ( );  Traction          mins 26371  Low Eval          Mins  98397  Mod Eval          Mins  21077  High Eval                            Mins  66622  Re-Eval                               mins  38843    Timed Treatment:  50    mins   Total Treatment:     50   mins    Alex Al PT, CLT  Physical Therapist

## 2020-08-06 ENCOUNTER — TREATMENT (OUTPATIENT)
Dept: PHYSICAL THERAPY | Facility: CLINIC | Age: 70
End: 2020-08-06

## 2020-08-06 DIAGNOSIS — Z96.652 STATUS POST TOTAL LEFT KNEE REPLACEMENT: Primary | ICD-10-CM

## 2020-08-06 PROCEDURE — 97110 THERAPEUTIC EXERCISES: CPT | Performed by: PHYSICAL THERAPIST

## 2020-08-06 RX ORDER — ROSUVASTATIN CALCIUM 20 MG/1
20 TABLET, COATED ORAL
Qty: 90 TABLET | Refills: 0 | Status: SHIPPED | OUTPATIENT
Start: 2020-08-06 | End: 2020-11-09 | Stop reason: SDUPTHER

## 2020-08-06 NOTE — PROGRESS NOTES
Physical Therapy Daily Progress Note    Patient: Africa Licona  : 1950  Referring practitioner: Keshawn Conrad MD  Today's Date: 2020    VISIT#: 20    Subjective   Pt reports: doing pretty well, walked about a mile and 1/2. Getting some weird sensation over the incision scar.       Objective     See Exercise, Manual, and Modality Logs for complete treatment.     Patient Education:    Assessment & Plan     Assessment  Assessment details: Good mata to today's treatment , making steady progress.           Progress per Plan of Care            Timed:         Manual Therapy:         mins  32965;     Therapeutic Exercise:    55     mins  57442;     Neuromuscular Demetrius:        mins  19686;    Therapeutic Activity:          mins  32203;     Gait Training:           mins  16255;     Ultrasound:          mins  37026;    Ionto                                   mins   16162  Self Care                            mins   50773  Canal repositioning           mins    36633    Un-Timed:  Electrical Stimulation:         mins  94558 ( );  Traction          mins 77693  Low Eval          Mins  28955  Mod Eval          Mins  64320  High Eval                            Mins  62369  Re-Eval                               mins  01580    Timed Treatment:  55    mins   Total Treatment:    55    mins    Alex Al, PT, CLT  Physical Therapist

## 2020-08-11 ENCOUNTER — TREATMENT (OUTPATIENT)
Dept: PHYSICAL THERAPY | Facility: CLINIC | Age: 70
End: 2020-08-11

## 2020-08-11 DIAGNOSIS — Z96.652 STATUS POST TOTAL LEFT KNEE REPLACEMENT: Primary | ICD-10-CM

## 2020-08-11 PROCEDURE — 97110 THERAPEUTIC EXERCISES: CPT | Performed by: PHYSICAL THERAPIST

## 2020-08-11 NOTE — PROGRESS NOTES
Physical Therapy Daily Progress Note    Patient: Africa Licona  : 1950  Referring practitioner: Keshawn Conrad MD  Today's Date: 2020    VISIT#: 21    Subjective   Pt reports: doing pretty well, making steady progress. Waking/ getting in/ out of car and most daily activities much easier to perform. Still having difficulty standing on L leg for long.       Objective     See Exercise, Manual, and Modality Logs for complete treatment.     Patient Education: work on SLS and single heel raises    Assessment & Plan     Assessment  Assessment details: Good mata to today's treatment. Making steady progress. Improved gait/ ROM and overall strength. SLS and single heel raises still very challenging and difficult to do.           Progress per Plan of Care            Timed:         Manual Therapy:         mins  09683;     Therapeutic Exercise:  55       mins  24358;     Neuromuscular Demetrius:        mins  08630;    Therapeutic Activity:          mins  14504;     Gait Training:           mins  82817;     Ultrasound:          mins  98351;    Ionto                                   mins   65513  Self Care                            mins   86604  Canal repositioning           mins    41379    Un-Timed:  Electrical Stimulation:         mins  09517 ( );  Traction          mins 29499  Low Eval          Mins  71342  Mod Eval          Mins  64868  High Eval                            Mins  70159  Re-Eval                               mins  52327    Timed Treatment:   55   mins   Total Treatment:    55    mins    Alex Al PT, CLT  Physical Therapist

## 2020-08-13 ENCOUNTER — TREATMENT (OUTPATIENT)
Dept: PHYSICAL THERAPY | Facility: CLINIC | Age: 70
End: 2020-08-13

## 2020-08-13 DIAGNOSIS — Z96.652 STATUS POST TOTAL LEFT KNEE REPLACEMENT: Primary | ICD-10-CM

## 2020-08-13 PROCEDURE — 97110 THERAPEUTIC EXERCISES: CPT | Performed by: PHYSICAL THERAPIST

## 2020-08-13 NOTE — PROGRESS NOTES
Physical Therapy Daily Progress Note    Patient: Africa Licona  : 1950  Referring practitioner: Keshawn Conrad MD  Today's Date: 2020    VISIT#: 22     L TKR 6/3/20     Next f/u visit 20    Subjective   Pt reports: doing pretty well, continues to improve, still difficult to stand on L leg for long. Denies any pain presently. Sleeping well at night.       Objective     See Exercise, Manual, and Modality Logs for complete treatment.     Patient Education:    Assessment & Plan     Assessment  Assessment details: Good mata to today's treatment, making steady progress. Still having difficulties with SLS on L leg without UE support. Gait/ ROM/ overall strength continues to improve.           Progress per Plan of Care/ next visit is her last scheduled appt will see MD for f/u the following week.             Timed:         Manual Therapy:         mins  22654;     Therapeutic Exercise:     55    mins  57249;     Neuromuscular Demetrius:        mins  14415;    Therapeutic Activity:          mins  88512;     Gait Training:           mins  90634;     Ultrasound:          mins  16427;    Ionto                                   mins   07184  Self Care                            mins   75294  Canal repositioning           mins    65795    Un-Timed:  Electrical Stimulation:         mins  76712 ( );  Traction          mins 45993  Low Eval          Mins  10608  Mod Eval          Mins  28740  High Eval                            Mins  01656  Re-Eval                               mins  05990    Timed Treatment:   55   mins   Total Treatment:    55    mins    Alex Al, PT, CLT  Physical Therapist

## 2020-08-18 ENCOUNTER — TREATMENT (OUTPATIENT)
Dept: PHYSICAL THERAPY | Facility: CLINIC | Age: 70
End: 2020-08-18

## 2020-08-18 DIAGNOSIS — Z96.652 STATUS POST TOTAL LEFT KNEE REPLACEMENT: Primary | ICD-10-CM

## 2020-08-18 PROCEDURE — 97110 THERAPEUTIC EXERCISES: CPT | Performed by: PHYSICAL THERAPIST

## 2020-08-18 NOTE — PROGRESS NOTES
Physical Therapy Daily Progress Note    Patient: Africa Licona  : 1950  Referring practitioner: Keshawn Conrad MD  Today's Date: 2020    VISIT#: 23     L TKR 6/3/20     Next f/u visit 20    Subjective   Pt reports: doing pretty well, she is ready for DC to cont with her HEP. Has been able to take long walks without inc pain in L knee, her R knee bothers her more now. Most of the time worst pain no more than a 2/10.       Objective          Active Range of Motion   Left Knee   Flexion: 130 degrees   Extension: 0 degrees     Strength/Myotome Testing     Left Knee   Normal strength        See Exercise, Manual, and Modality Logs for complete treatment.     Patient Education:    Assessment & Plan     Assessment  Assessment details: Pt is s/p L TKR, she has done well in therapy and has responded well to therapy. Has made steady progress with inc ROM/ strength, improved gait and functional mobility. Pt voices readiness for DC with I program.  All STGs and LTGs met. F/u visit with MD next week. Recommend DC with I program and self management.     Goals  Plan Goals: Plan Goals: Plan Goals: STGs: in 3 weeks     1- Pt will tolerate  initial HEP and progression of her exercise program ( met )  2- Pt will be I with initial HEP ( met )  3- Pt will reports at least 35% improvement and pain reduction ( met )  4- L knee flex will improve to 95 deg or greater ( )  5- Pt will ambulate with a cane with fairly normal gait pattern ( met )     LTGs: by DC     1- Pt will report at least 75 % improvement and pain reduction ( met )   2- Pt will be I with final HEP and self management of her condition ( met )  3- Pt will have improved LEFS score compare to initial score indicating  improved function ( met )  4- Pt will voice readiness for DC with I program ( )  5- L knee flex will be 105 deg or greater ( met )  6- L knee ext will be 5 deg or less (met )  7- pt will ambulate  without a.d. With fairly normal gait pattern ( met 8/4)       Plan  Plan details: Recommend DC with HEP and self management of her condition.                   Timed:         Manual Therapy:         mins  98006;     Therapeutic Exercise:   45      mins  13006;     Neuromuscular Demetrius:        mins  08908;    Therapeutic Activity:          mins  64386;     Gait Training:           mins  60912;     Ultrasound:          mins  99487;    Ionto                                   mins   60193  Self Care                            mins   54955  Canal repositioning           mins    08481    Un-Timed:  Electrical Stimulation:         mins  38223 ( );  Traction          mins 62858  Low Eval          Mins  83475  Mod Eval          Mins  77752  High Eval                            Mins  55775  Re-Eval                               mins  48034    Timed Treatment:  45   mins   Total Treatment:     45   mins    Alex Al PT, CLT  Physical Therapist

## 2020-08-18 NOTE — PROGRESS NOTES
Discharge Summary  Discharge Summary from Physical/Occupational Therapy Report    Patient: Africa Licona   : 1950  Today's Date: 2020    Patient seen for 23 visits.  Dates of Service: 20 to 20    Discharge Status of Patient: See treatment note dated 20    Goals: All Met    Discharge Plan: Continue with current home exercise program as instructed        SIGNATURE: Alex Al PT

## 2020-08-25 ENCOUNTER — TELEMEDICINE (OUTPATIENT)
Dept: FAMILY MEDICINE CLINIC | Facility: CLINIC | Age: 70
End: 2020-08-25

## 2020-08-25 DIAGNOSIS — L23.7 ALLERGIC CONTACT DERMATITIS DUE TO PLANTS, EXCEPT FOOD: Primary | ICD-10-CM

## 2020-08-25 PROCEDURE — 99213 OFFICE O/P EST LOW 20 MIN: CPT | Performed by: FAMILY MEDICINE

## 2020-08-25 RX ORDER — METHYLPREDNISOLONE 4 MG/1
TABLET ORAL
Qty: 1 EACH | Refills: 0 | Status: SHIPPED | OUTPATIENT
Start: 2020-08-25 | End: 2020-10-02

## 2020-08-25 NOTE — PROGRESS NOTES
Subjective   Chief Complaint   Patient presents with   • Rash     poison shobha     Africa Licona is a 69 y.o. female.   You have chosen to receive care through a telehealth visit.  Do you consent to use a video/audio connection for your medical care today? Yes    She worked in her yard 2 days ago and thinks she came in to contact with poison ivy.  She has had prior exposure to poison ivy that resulted in similar rash.     Rash   This is a new problem. The current episode started yesterday. The problem has been gradually worsening since onset. The affected locations include the face, left arm and right arm. The rash is characterized by redness, swelling and itchiness. She was exposed to plant contact. Associated symptoms include facial edema. Pertinent negatives include no cough, eye pain, fever, rhinorrhea, shortness of breath or sore throat. Past treatments include topical steroids. The treatment provided mild relief.      Past Medical History:   Diagnosis Date   • Allergic    • Arthritis    • Cataract    • Depression    • Hyperlipidemia    • Hypothyroidism    • Osteopenia      Past Surgical History:   Procedure Laterality Date   • COLONOSCOPY  02/2018     Allergies   Allergen Reactions   • Other Unknown (See Comments)     Wasps     • Cefazolin Itching     Social History     Socioeconomic History   • Marital status: Single     Spouse name: Not on file   • Number of children: Not on file   • Years of education: Not on file   • Highest education level: Not on file   Tobacco Use   • Smoking status: Former Smoker     Types: Cigarettes   • Smokeless tobacco: Never Used   • Tobacco comment: quit 25 yrs ago   Substance and Sexual Activity   • Alcohol use: Yes     Frequency: Monthly or less     Comment: caffeine 1c d     Social History     Tobacco Use   Smoking Status Former Smoker   • Types: Cigarettes   Smokeless Tobacco Never Used   Tobacco Comment    quit 25 yrs ago       family history includes Hypertension in an other  family member.  Current Outpatient Medications on File Prior to Visit   Medication Sig Dispense Refill   • Cholecalciferol (VITAMIN D3) 2000 units tablet Daily.     • Cyanocobalamin (B-12) 1000 MCG tablet Daily.     • EPINEPHrine (EPIPEN) 0.3 MG/0.3ML solution auto-injector injection EPIPEN 0.3 MG/0.3ML CANDIDO     • FLUoxetine (PROzac) 20 MG capsule TAKE ONE CAPSULE BY MOUTH DAILY 90 capsule 1   • lisinopril (PRINIVIL,ZESTRIL) 10 MG tablet TAKE 1 TABLET BY MOUTH DAILY 90 tablet 0   • meloxicam (MOBIC) 15 MG tablet Take  by mouth Daily.  0   • pantoprazole (PROTONIX) 40 MG EC tablet TK 1 T PO QAM FOR 90 DAYS  2   • rosuvastatin (CRESTOR) 20 MG tablet TAKE 1 TABLET BY MOUTH EVERY NIGHT AT BEDTIME 90 tablet 0   • SYNTHROID 150 MCG tablet Take 1 tablet by mouth Daily. 30 tablet 2   • [DISCONTINUED] methylPREDNISolone (Medrol) 4 MG tablet Take as directed on package instructions. 1 each 0     No current facility-administered medications on file prior to visit.      Patient Active Problem List   Diagnosis   • Encounter for general adult medical examination without abnormal findings   • Hyperlipidemia   • Hypertension   • Hypothyroidism   • Screening for breast cancer   • Postmenopausal   • Other screening mammogram   • Depression   • Esophageal stricture   • Rash of face   • Allergic contact dermatitis due to plants, except food       The following portions of the patient's history were reviewed and updated as appropriate: allergies, current medications, past family history, past medical history, past social history, past surgical history and problem list.    Review of Systems   Constitutional: Negative for chills and fever.   HENT: Negative for rhinorrhea, sinus pressure and sore throat.    Eyes: Negative for blurred vision and pain.   Respiratory: Negative for cough and shortness of breath.    Cardiovascular: Negative for chest pain and palpitations.   Gastrointestinal: Negative for abdominal pain.   Endocrine: Negative  for polyuria.   Skin: Positive for rash.   Neurological: Negative for dizziness and headache.   Hematological: Negative for adenopathy.   Psychiatric/Behavioral: Negative for depressed mood.       Objective   There were no vitals taken for this visit.  Physical Exam   Constitutional: She appears well-developed and well-nourished. No distress.   Pulmonary/Chest: Effort normal.   Neurological: She is alert.   Skin: Rash noted. There is erythema.   Swelling and redness on face, especially around left eye.   Psychiatric: She has a normal mood and affect.       No visits with results within 1 Week(s) from this visit.   Latest known visit with results is:   Lab on 05/28/2020   Component Date Value Ref Range Status   • Color, UA 05/28/2020 Yellow  Yellow, Straw Final   • Appearance, UA 05/28/2020 Clear  Clear Final   • pH, UA 05/28/2020 5.5  5.0 - 8.0 Final   • Specific Gravity, UA 05/28/2020 1.018  1.005 - 1.030 Final   • Glucose, UA 05/28/2020 Negative  Negative Final   • Ketones, UA 05/28/2020 Negative  Negative Final   • Bilirubin, UA 05/28/2020 Negative  Negative Final   • Blood, UA 05/28/2020 Negative  Negative Final   • Protein, UA 05/28/2020 Negative  Negative Final   • Leuk Esterase, UA 05/28/2020 Small (1+)* Negative Final   • Nitrite, UA 05/28/2020 Negative  Negative Final   • Urobilinogen, UA 05/28/2020 0.2 E.U./dL  0.2 - 1.0 E.U./dL Final   • Glucose 05/28/2020 109* 65 - 99 mg/dL Final   • BUN 05/28/2020 15  8 - 23 mg/dL Final   • Creatinine 05/28/2020 0.92  0.57 - 1.00 mg/dL Final   • Sodium 05/28/2020 139  136 - 145 mmol/L Final   • Potassium 05/28/2020 4.2  3.5 - 5.2 mmol/L Final   • Chloride 05/28/2020 103  98 - 107 mmol/L Final   • CO2 05/28/2020 23.9  22.0 - 29.0 mmol/L Final   • Calcium 05/28/2020 9.4  8.6 - 10.5 mg/dL Final   • Total Protein 05/28/2020 7.1  6.0 - 8.5 g/dL Final   • Albumin 05/28/2020 4.40  3.50 - 5.20 g/dL Final   • ALT (SGPT) 05/28/2020 97* 1 - 33 U/L Final   • AST (SGOT) 05/28/2020  95* 1 - 32 U/L Final   • Alkaline Phosphatase 05/28/2020 52  39 - 117 U/L Final   • Total Bilirubin 05/28/2020 0.6  0.2 - 1.2 mg/dL Final   • eGFR Non African Amer 05/28/2020 60* >60 mL/min/1.73 Final   • Globulin 05/28/2020 2.7  gm/dL Final   • A/G Ratio 05/28/2020 1.6  g/dL Final   • BUN/Creatinine Ratio 05/28/2020 16.3  7.0 - 25.0 Final   • Anion Gap 05/28/2020 12.1  5.0 - 15.0 mmol/L Final   • WBC 05/28/2020 5.25  3.40 - 10.80 10*3/mm3 Final   • RBC 05/28/2020 4.55  3.77 - 5.28 10*6/mm3 Final   • Hemoglobin 05/28/2020 13.0  12.0 - 15.9 g/dL Final   • Hematocrit 05/28/2020 39.8  34.0 - 46.6 % Final   • MCV 05/28/2020 87.5  79.0 - 97.0 fL Final   • MCH 05/28/2020 28.6  26.6 - 33.0 pg Final   • MCHC 05/28/2020 32.7  31.5 - 35.7 g/dL Final   • RDW 05/28/2020 15.3  12.3 - 15.4 % Final   • RDW-SD 05/28/2020 49.5  37.0 - 54.0 fl Final   • MPV 05/28/2020 11.2  6.0 - 12.0 fL Final   • Platelets 05/28/2020 224  140 - 450 10*3/mm3 Final   • Neutrophil % 05/28/2020 47.0  42.7 - 76.0 % Final   • Lymphocyte % 05/28/2020 39.4  19.6 - 45.3 % Final   • Monocyte % 05/28/2020 8.8  5.0 - 12.0 % Final   • Eosinophil % 05/28/2020 3.6  0.3 - 6.2 % Final   • Basophil % 05/28/2020 0.8  0.0 - 1.5 % Final   • Immature Grans % 05/28/2020 0.4  0.0 - 0.5 % Final   • Neutrophils, Absolute 05/28/2020 2.47  1.70 - 7.00 10*3/mm3 Final   • Lymphocytes, Absolute 05/28/2020 2.07  0.70 - 3.10 10*3/mm3 Final   • Monocytes, Absolute 05/28/2020 0.46  0.10 - 0.90 10*3/mm3 Final   • Eosinophils, Absolute 05/28/2020 0.19  0.00 - 0.40 10*3/mm3 Final   • Basophils, Absolute 05/28/2020 0.04  0.00 - 0.20 10*3/mm3 Final   • Immature Grans, Absolute 05/28/2020 0.02  0.00 - 0.05 10*3/mm3 Final   • nRBC 05/28/2020 0.0  0.0 - 0.2 /100 WBC Final   • RBC, UA 05/28/2020 None Seen  None Seen, 0-2 /HPF Final   • WBC, UA 05/28/2020 6-12* None Seen, 0-2 /HPF Final   • Bacteria, UA 05/28/2020 1+* None Seen /HPF Final   • Squamous Epithelial Cells, UA 05/28/2020 0-2  None  Seen, 0-2 /HPF Final   • Hyaline Casts, UA 05/28/2020 None Seen  None Seen /LPF Final   • Methodology 05/28/2020 Manual Light Microscopy   Final   • Urine Culture 05/28/2020 25,000 CFU/mL Mixed Joanne Isolated   Final           Assessment/Plan   [place] was seen today for rash.    Diagnoses and all orders for this visit:    Allergic contact dermatitis due to plants, except food  Comments:  Allergic reaction to poison ivy.  She is planning to see her ophthalmologist today.  Try Medrol but if not better in 2 days she will need to be seen in office.  Orders:  -     methylPREDNISolone (Medrol) 4 MG dose pack; Take as directed on package instructions.        Total time spent on evaluation of patient: 15 min

## 2020-10-02 ENCOUNTER — OFFICE VISIT (OUTPATIENT)
Dept: FAMILY MEDICINE CLINIC | Facility: CLINIC | Age: 70
End: 2020-10-02

## 2020-10-02 VITALS
TEMPERATURE: 97.1 F | WEIGHT: 190 LBS | OXYGEN SATURATION: 97 % | SYSTOLIC BLOOD PRESSURE: 155 MMHG | HEART RATE: 74 BPM | BODY MASS INDEX: 28.47 KG/M2 | DIASTOLIC BLOOD PRESSURE: 92 MMHG

## 2020-10-02 DIAGNOSIS — Z78.0 POSTMENOPAUSAL: ICD-10-CM

## 2020-10-02 DIAGNOSIS — Z12.31 ENCOUNTER FOR SCREENING MAMMOGRAM FOR MALIGNANT NEOPLASM OF BREAST: ICD-10-CM

## 2020-10-02 DIAGNOSIS — R42 VERTIGO: Primary | ICD-10-CM

## 2020-10-02 PROCEDURE — 99213 OFFICE O/P EST LOW 20 MIN: CPT | Performed by: FAMILY MEDICINE

## 2020-10-02 RX ORDER — MECLIZINE HYDROCHLORIDE 25 MG/1
25 TABLET ORAL 3 TIMES DAILY PRN
Qty: 30 TABLET | Refills: 0 | Status: SHIPPED | OUTPATIENT
Start: 2020-10-02

## 2020-10-02 NOTE — PROGRESS NOTES
Subjective   Chief Complaint   Patient presents with   • Dizziness     x 9/29     Africa Licona is a 70 y.o. female.     No falls.  No head injury.  COVID-19 test yesterday.     Dizziness  This is a new problem. The current episode started in the past 7 days. The problem occurs daily. The problem has been waxing and waning. Associated symptoms include headaches and vertigo. Pertinent negatives include no abdominal pain, chest pain, chills, congestion, coughing, fatigue, fever, rash, sore throat or swollen glands. The symptoms are aggravated by twisting. She has tried nothing for the symptoms.      Past Medical History:   Diagnosis Date   • Allergic    • Arthritis    • Cataract    • Depression    • Hyperlipidemia    • Hypothyroidism    • Osteopenia      Past Surgical History:   Procedure Laterality Date   • COLONOSCOPY  02/2018   • JOINT REPLACEMENT Left 06/03/2020    knee     Allergies   Allergen Reactions   • Other Unknown (See Comments)     Wasps     • Cefazolin Itching     Social History     Socioeconomic History   • Marital status:      Spouse name: Not on file   • Number of children: Not on file   • Years of education: Not on file   • Highest education level: Not on file   Tobacco Use   • Smoking status: Former Smoker     Types: Cigarettes   • Smokeless tobacco: Never Used   • Tobacco comment: quit 25 yrs ago   Substance and Sexual Activity   • Alcohol use: Yes     Frequency: Monthly or less     Comment: caffeine 1c d     Social History     Tobacco Use   Smoking Status Former Smoker   • Types: Cigarettes   Smokeless Tobacco Never Used   Tobacco Comment    quit 25 yrs ago       family history includes Hypertension in an other family member.  Current Outpatient Medications on File Prior to Visit   Medication Sig Dispense Refill   • Cholecalciferol (VITAMIN D3) 2000 units tablet Daily.     • Cyanocobalamin (B-12) 1000 MCG tablet Daily.     • EPINEPHrine (EPIPEN) 0.3 MG/0.3ML solution auto-injector injection  EPIPEN 0.3 MG/0.3ML CANDIDO     • FLUoxetine (PROzac) 20 MG capsule TAKE ONE CAPSULE BY MOUTH DAILY 90 capsule 1   • lisinopril (PRINIVIL,ZESTRIL) 10 MG tablet TAKE 1 TABLET BY MOUTH DAILY 90 tablet 0   • meloxicam (MOBIC) 15 MG tablet Take  by mouth Daily.  0   • pantoprazole (PROTONIX) 40 MG EC tablet TK 1 T PO QAM FOR 90 DAYS  2   • rosuvastatin (CRESTOR) 20 MG tablet TAKE 1 TABLET BY MOUTH EVERY NIGHT AT BEDTIME 90 tablet 0     No current facility-administered medications on file prior to visit.      Patient Active Problem List   Diagnosis   • Encounter for general adult medical examination without abnormal findings   • Hyperlipidemia   • Hypertension   • Hypothyroidism   • Screening for breast cancer   • Postmenopausal   • Other screening mammogram   • Depression   • Esophageal stricture   • Rash of face   • Allergic contact dermatitis due to plants, except food       The following portions of the patient's history were reviewed and updated as appropriate: allergies, current medications, past family history, past medical history, past social history, past surgical history and problem list.    Review of Systems   Constitutional: Negative for chills, fatigue and fever.   HENT: Negative for congestion, sinus pressure, sore throat and swollen glands.    Eyes: Negative for blurred vision.   Respiratory: Negative for cough, shortness of breath and wheezing.    Cardiovascular: Negative for chest pain and palpitations.   Gastrointestinal: Negative for abdominal pain.   Endocrine: Negative for polyuria.   Genitourinary: Negative for difficulty urinating.   Skin: Negative for rash.   Neurological: Positive for dizziness and vertigo. Negative for seizures and headache.   Hematological: Negative for adenopathy.   Psychiatric/Behavioral: Negative for depressed mood.       Objective   /92 (BP Location: Left arm, Patient Position: Sitting, Cuff Size: Adult)   Pulse 74   Temp 97.1 °F (36.2 °C)   Wt 86.2 kg (190 lb)    SpO2 97%   BMI 28.47 kg/m²   Physical Exam  Constitutional:       General: She is not in acute distress.     Appearance: She is well-developed.   HENT:      Head: Normocephalic.   Eyes:      General: Lids are normal.      Conjunctiva/sclera: Conjunctivae normal.   Neck:      Musculoskeletal: Normal range of motion.      Thyroid: No thyroid mass or thyromegaly.      Trachea: Trachea normal.   Cardiovascular:      Rate and Rhythm: Normal rate and regular rhythm.      Heart sounds: Normal heart sounds.   Pulmonary:      Effort: Pulmonary effort is normal.      Breath sounds: Normal breath sounds.   Abdominal:      Palpations: Abdomen is soft.   Lymphadenopathy:      Cervical: No cervical adenopathy.   Skin:     General: Skin is warm and dry.   Neurological:      Mental Status: She is alert and oriented to person, place, and time.   Psychiatric:         Attention and Perception: She is attentive.         Mood and Affect: Mood normal.         Speech: Speech normal.         Behavior: Behavior normal.         No visits with results within 1 Week(s) from this visit.   Latest known visit with results is:   Lab on 05/28/2020   Component Date Value Ref Range Status   • Color, UA 05/28/2020 Yellow  Yellow, Straw Final   • Appearance, UA 05/28/2020 Clear  Clear Final   • pH, UA 05/28/2020 5.5  5.0 - 8.0 Final   • Specific Gravity, UA 05/28/2020 1.018  1.005 - 1.030 Final   • Glucose, UA 05/28/2020 Negative  Negative Final   • Ketones, UA 05/28/2020 Negative  Negative Final   • Bilirubin, UA 05/28/2020 Negative  Negative Final   • Blood, UA 05/28/2020 Negative  Negative Final   • Protein, UA 05/28/2020 Negative  Negative Final   • Leuk Esterase, UA 05/28/2020 Small (1+)* Negative Final   • Nitrite, UA 05/28/2020 Negative  Negative Final   • Urobilinogen, UA 05/28/2020 0.2 E.U./dL  0.2 - 1.0 E.U./dL Final   • Glucose 05/28/2020 109* 65 - 99 mg/dL Final   • BUN 05/28/2020 15  8 - 23 mg/dL Final   • Creatinine 05/28/2020 0.92  0.57  - 1.00 mg/dL Final   • Sodium 05/28/2020 139  136 - 145 mmol/L Final   • Potassium 05/28/2020 4.2  3.5 - 5.2 mmol/L Final   • Chloride 05/28/2020 103  98 - 107 mmol/L Final   • CO2 05/28/2020 23.9  22.0 - 29.0 mmol/L Final   • Calcium 05/28/2020 9.4  8.6 - 10.5 mg/dL Final   • Total Protein 05/28/2020 7.1  6.0 - 8.5 g/dL Final   • Albumin 05/28/2020 4.40  3.50 - 5.20 g/dL Final   • ALT (SGPT) 05/28/2020 97* 1 - 33 U/L Final   • AST (SGOT) 05/28/2020 95* 1 - 32 U/L Final   • Alkaline Phosphatase 05/28/2020 52  39 - 117 U/L Final   • Total Bilirubin 05/28/2020 0.6  0.2 - 1.2 mg/dL Final   • eGFR Non African Amer 05/28/2020 60* >60 mL/min/1.73 Final   • Globulin 05/28/2020 2.7  gm/dL Final   • A/G Ratio 05/28/2020 1.6  g/dL Final   • BUN/Creatinine Ratio 05/28/2020 16.3  7.0 - 25.0 Final   • Anion Gap 05/28/2020 12.1  5.0 - 15.0 mmol/L Final   • WBC 05/28/2020 5.25  3.40 - 10.80 10*3/mm3 Final   • RBC 05/28/2020 4.55  3.77 - 5.28 10*6/mm3 Final   • Hemoglobin 05/28/2020 13.0  12.0 - 15.9 g/dL Final   • Hematocrit 05/28/2020 39.8  34.0 - 46.6 % Final   • MCV 05/28/2020 87.5  79.0 - 97.0 fL Final   • MCH 05/28/2020 28.6  26.6 - 33.0 pg Final   • MCHC 05/28/2020 32.7  31.5 - 35.7 g/dL Final   • RDW 05/28/2020 15.3  12.3 - 15.4 % Final   • RDW-SD 05/28/2020 49.5  37.0 - 54.0 fl Final   • MPV 05/28/2020 11.2  6.0 - 12.0 fL Final   • Platelets 05/28/2020 224  140 - 450 10*3/mm3 Final   • Neutrophil % 05/28/2020 47.0  42.7 - 76.0 % Final   • Lymphocyte % 05/28/2020 39.4  19.6 - 45.3 % Final   • Monocyte % 05/28/2020 8.8  5.0 - 12.0 % Final   • Eosinophil % 05/28/2020 3.6  0.3 - 6.2 % Final   • Basophil % 05/28/2020 0.8  0.0 - 1.5 % Final   • Immature Grans % 05/28/2020 0.4  0.0 - 0.5 % Final   • Neutrophils, Absolute 05/28/2020 2.47  1.70 - 7.00 10*3/mm3 Final   • Lymphocytes, Absolute 05/28/2020 2.07  0.70 - 3.10 10*3/mm3 Final   • Monocytes, Absolute 05/28/2020 0.46  0.10 - 0.90 10*3/mm3 Final   • Eosinophils, Absolute  05/28/2020 0.19  0.00 - 0.40 10*3/mm3 Final   • Basophils, Absolute 05/28/2020 0.04  0.00 - 0.20 10*3/mm3 Final   • Immature Grans, Absolute 05/28/2020 0.02  0.00 - 0.05 10*3/mm3 Final   • nRBC 05/28/2020 0.0  0.0 - 0.2 /100 WBC Final   • RBC, UA 05/28/2020 None Seen  None Seen, 0-2 /HPF Final   • WBC, UA 05/28/2020 6-12* None Seen, 0-2 /HPF Final   • Bacteria, UA 05/28/2020 1+* None Seen /HPF Final   • Squamous Epithelial Cells, UA 05/28/2020 0-2  None Seen, 0-2 /HPF Final   • Hyaline Casts, UA 05/28/2020 None Seen  None Seen /LPF Final   • Methodology 05/28/2020 Manual Light Microscopy   Final   • Urine Culture 05/28/2020 25,000 CFU/mL Mixed Joanne Isolated   Final           Assessment/Plan   Diagnoses and all orders for this visit:    1. Vertigo (Primary)  -     meclizine (ANTIVERT) 25 MG tablet; Take 1 tablet by mouth 3 (Three) Times a Day As Needed for Dizziness.  Dispense: 30 tablet; Refill: 0  -     Ambulatory Referral to Physical Therapy Vestibular    2. Postmenopausal  -     DEXA Bone Density Axial    3. Encounter for screening mammogram for malignant neoplasm of breast  -     Mammo Screening Digital Tomosynthesis Bilateral With CAD; Future

## 2020-10-06 RX ORDER — LEVOTHYROXINE SODIUM 150 MCG
150 TABLET ORAL DAILY
Qty: 30 TABLET | Refills: 5 | Status: SHIPPED | OUTPATIENT
Start: 2020-10-06 | End: 2020-10-23

## 2020-10-19 DIAGNOSIS — I10 HYPERTENSION, UNSPECIFIED TYPE: ICD-10-CM

## 2020-10-19 DIAGNOSIS — E03.9 HYPOTHYROIDISM, UNSPECIFIED TYPE: Primary | ICD-10-CM

## 2020-10-19 DIAGNOSIS — E78.5 HYPERLIPIDEMIA, UNSPECIFIED HYPERLIPIDEMIA TYPE: ICD-10-CM

## 2020-10-20 ENCOUNTER — LAB (OUTPATIENT)
Dept: FAMILY MEDICINE CLINIC | Facility: CLINIC | Age: 70
End: 2020-10-20

## 2020-10-20 ENCOUNTER — OFFICE VISIT (OUTPATIENT)
Dept: FAMILY MEDICINE CLINIC | Facility: CLINIC | Age: 70
End: 2020-10-20

## 2020-10-20 VITALS
SYSTOLIC BLOOD PRESSURE: 111 MMHG | TEMPERATURE: 97.3 F | HEART RATE: 79 BPM | BODY MASS INDEX: 28.29 KG/M2 | WEIGHT: 191 LBS | OXYGEN SATURATION: 95 % | HEIGHT: 69 IN | RESPIRATION RATE: 16 BRPM | DIASTOLIC BLOOD PRESSURE: 74 MMHG

## 2020-10-20 DIAGNOSIS — Z00.00 ENCOUNTER FOR SUBSEQUENT ANNUAL WELLNESS VISIT (AWV) IN MEDICARE PATIENT: Primary | ICD-10-CM

## 2020-10-20 LAB
ALBUMIN SERPL-MCNC: 4.5 G/DL (ref 3.5–5.2)
ALBUMIN/GLOB SERPL: 1.7 G/DL
ALP SERPL-CCNC: 57 U/L (ref 39–117)
ALT SERPL W P-5'-P-CCNC: 58 U/L (ref 1–33)
ANION GAP SERPL CALCULATED.3IONS-SCNC: 8.1 MMOL/L (ref 5–15)
AST SERPL-CCNC: 70 U/L (ref 1–32)
BILIRUB SERPL-MCNC: 0.2 MG/DL (ref 0–1.2)
BUN SERPL-MCNC: 26 MG/DL (ref 8–23)
BUN/CREAT SERPL: 29.9 (ref 7–25)
CALCIUM SPEC-SCNC: 9.2 MG/DL (ref 8.6–10.5)
CHLORIDE SERPL-SCNC: 103 MMOL/L (ref 98–107)
CHOLEST SERPL-MCNC: 255 MG/DL (ref 0–200)
CO2 SERPL-SCNC: 27.9 MMOL/L (ref 22–29)
CREAT SERPL-MCNC: 0.87 MG/DL (ref 0.57–1)
GFR SERPL CREATININE-BSD FRML MDRD: 64 ML/MIN/1.73
GLOBULIN UR ELPH-MCNC: 2.6 GM/DL
GLUCOSE SERPL-MCNC: 102 MG/DL (ref 65–99)
HDLC SERPL-MCNC: 84 MG/DL (ref 40–60)
LDLC SERPL CALC-MCNC: 155 MG/DL (ref 0–100)
LDLC/HDLC SERPL: 1.81 {RATIO}
POTASSIUM SERPL-SCNC: 4.8 MMOL/L (ref 3.5–5.2)
PROT SERPL-MCNC: 7.1 G/DL (ref 6–8.5)
SODIUM SERPL-SCNC: 139 MMOL/L (ref 136–145)
TRIGL SERPL-MCNC: 94 MG/DL (ref 0–150)
TSH SERPL DL<=0.05 MIU/L-ACNC: 26.9 UIU/ML (ref 0.27–4.2)
VLDLC SERPL-MCNC: 16 MG/DL (ref 5–40)

## 2020-10-20 PROCEDURE — 36415 COLL VENOUS BLD VENIPUNCTURE: CPT | Performed by: FAMILY MEDICINE

## 2020-10-20 PROCEDURE — 84443 ASSAY THYROID STIM HORMONE: CPT | Performed by: FAMILY MEDICINE

## 2020-10-20 PROCEDURE — 80061 LIPID PANEL: CPT | Performed by: FAMILY MEDICINE

## 2020-10-20 PROCEDURE — G0439 PPPS, SUBSEQ VISIT: HCPCS | Performed by: FAMILY MEDICINE

## 2020-10-20 PROCEDURE — 80053 COMPREHEN METABOLIC PANEL: CPT | Performed by: FAMILY MEDICINE

## 2020-10-20 NOTE — PROGRESS NOTES
Medicare Wellness Visit   The ABC's of the Annual Wellness Visit    Chief Complaint   Patient presents with   • Medicare Wellness-subsequent       HPI:  Africa Licona, -1950, is a 70 y.o. female who presents for an Initial Medicare Wellness Visit.    Recent Hospitalizations:  No hospitalization(s) within the last year..    Current Medical Providers:  Patient Care Team:  Luz Perdomo MD as PCP - Saundra Lopez APRN as PCP - [facility]  Keshawn Conrad MD as Consulting Physician (Orthopedic Surgery)    Health Habits and Functional and Cognitive Screening and Depression Screening:  Functional & Cognitive Status 10/20/2020   Do you have difficulty preparing food and eating? No   Do you have difficulty bathing yourself, getting dressed or grooming yourself? No   Do you have difficulty using the toilet? No   Do you have difficulty moving around from place to place? No   Do you have trouble with steps or getting out of a bed or a chair? No   Current Diet Well Balanced Diet   Dental Exam Up to date   Eye Exam Up to date   Exercise (times per week) 3 times per week   Current Exercises Include Aerobics   Current Exercise Activities Include -   Do you need help using the phone?  No   Are you deaf or do you have serious difficulty hearing?  No   Do you need help with transportation? No   Do you need help shopping? No   Do you need help preparing meals?  No   Do you need help with housework?  No   Do you need help with laundry? No   Do you need help taking your medications? No   Do you need help managing money? -   Do you ever drive or ride in a car without wearing a seat belt? No   Have you felt unusual stress, anger or loneliness in the last month? -   Who do you live with? Spouse   If you need help, do you have trouble finding someone available to you? No   Have you been bothered in the last four weeks by sexual problems? -   Do you have difficulty concentrating, remembering or making  decisions? No       Compared to one year ago, the patient feels her physical health is the same and her mental health is the same.    Depression Screen:  PHQ-2/PHQ-9 Depression Screening 10/20/2020   Little interest or pleasure in doing things 0   Feeling down, depressed, or hopeless 0   Trouble falling or staying asleep, or sleeping too much -   Feeling tired or having little energy -   Poor appetite or overeating -   Feeling bad about yourself - or that you are a failure or have let yourself or your family down -   Trouble concentrating on things, such as reading the newspaper or watching television -   Moving or speaking so slowly that other people could have noticed. Or the opposite - being so fidgety or restless that you have been moving around a lot more than usual -   Thoughts that you would be better off dead, or of hurting yourself in some way -   Total Score 0   If you checked off any problems, how difficult have these problems made it for you to do your work, take care of things at home, or get along with other people? -         Past Medical/Family/Social History:  The following portions of the patient's history were reviewed and updated as appropriate: allergies, current medications, past family history, past medical history, past social history, past surgical history and problem list.    Allergies   Allergen Reactions   • Other Unknown (See Comments)     Wasps     • Cefazolin Itching         Current Outpatient Medications:   •  Cholecalciferol (VITAMIN D3) 2000 units tablet, Daily., Disp: , Rfl:   •  Cyanocobalamin (B-12) 1000 MCG tablet, Daily., Disp: , Rfl:   •  EPINEPHrine (EPIPEN) 0.3 MG/0.3ML solution auto-injector injection, EPIPEN 0.3 MG/0.3ML CANDIDO, Disp: , Rfl:   •  FLUoxetine (PROzac) 20 MG capsule, TAKE ONE CAPSULE BY MOUTH DAILY, Disp: 90 capsule, Rfl: 1  •  lisinopril (PRINIVIL,ZESTRIL) 10 MG tablet, TAKE 1 TABLET BY MOUTH DAILY, Disp: 90 tablet, Rfl: 0  •  meclizine (ANTIVERT) 25 MG tablet,  Take 1 tablet by mouth 3 (Three) Times a Day As Needed for Dizziness., Disp: 30 tablet, Rfl: 0  •  meloxicam (MOBIC) 15 MG tablet, Take  by mouth Daily., Disp: , Rfl: 0  •  pantoprazole (PROTONIX) 40 MG EC tablet, TK 1 T PO QAM FOR 90 DAYS, Disp: , Rfl: 2  •  rosuvastatin (CRESTOR) 20 MG tablet, TAKE 1 TABLET BY MOUTH EVERY NIGHT AT BEDTIME, Disp: 90 tablet, Rfl: 0  •  Synthroid 150 MCG tablet, TAKE 1 TABLET BY MOUTH DAILY, Disp: 30 tablet, Rfl: 5    Aspirin use counseling: Does not need ASA (and currently is not on it)    Current medication list contains no high risk medications.  No harmful drug interactions have been identified.     Family History   Problem Relation Age of Onset   • Hypertension Other        Social History     Tobacco Use   • Smoking status: Former Smoker     Types: Cigarettes   • Smokeless tobacco: Never Used   • Tobacco comment: quit 25 yrs ago   Substance Use Topics   • Alcohol use: Yes     Frequency: Monthly or less     Comment: caffeine 1c d       Past Surgical History:   Procedure Laterality Date   • COLONOSCOPY  02/2018   • JOINT REPLACEMENT Left 06/03/2020    knee       Patient Active Problem List   Diagnosis   • Encounter for general adult medical examination without abnormal findings   • Hyperlipidemia   • Hypertension   • Hypothyroidism   • Screening for breast cancer   • Postmenopausal   • Other screening mammogram   • Depression   • Esophageal stricture   • Rash of face   • Allergic contact dermatitis due to plants, except food       Review of Systems   Constitutional: Negative for chills and fever.   HENT: Negative for sinus pressure, sore throat and swollen glands.    Eyes: Negative for blurred vision.   Respiratory: Negative for cough, shortness of breath and wheezing.    Cardiovascular: Negative for chest pain and palpitations.   Gastrointestinal: Negative for abdominal pain.   Endocrine: Negative for polyuria.   Genitourinary: Negative for difficulty urinating.   Skin: Negative  "for rash.   Neurological: Negative for dizziness, seizures and headache.   Hematological: Negative for adenopathy.   Psychiatric/Behavioral: Negative for depressed mood.       Objective     Vitals:    10/20/20 1330   BP: 111/74   BP Location: Left arm   Patient Position: Sitting   Cuff Size: Large Adult   Pulse: 79   Resp: 16   Temp: 97.3 °F (36.3 °C)   TempSrc: Temporal   SpO2: 95%   Weight: 86.6 kg (191 lb)   Height: 174 cm (68.5\")       Patient's Body mass index is 28.62 kg/m². BMI is above normal parameters. Recommendations include: exercise counseling.      No exam data present    The patient has no evidence of cognitve impairment.     Physical Exam  Constitutional:       General: She is not in acute distress.     Appearance: She is well-developed.   HENT:      Head: Normocephalic.   Eyes:      General: Lids are normal.      Conjunctiva/sclera: Conjunctivae normal.   Neck:      Musculoskeletal: Normal range of motion.      Thyroid: No thyroid mass or thyromegaly.      Trachea: Trachea normal.   Cardiovascular:      Rate and Rhythm: Normal rate and regular rhythm.      Heart sounds: Normal heart sounds.   Pulmonary:      Effort: Pulmonary effort is normal.      Breath sounds: Normal breath sounds.   Abdominal:      Palpations: Abdomen is soft.   Lymphadenopathy:      Cervical: No cervical adenopathy.   Skin:     General: Skin is warm and dry.   Neurological:      Mental Status: She is alert and oriented to person, place, and time.   Psychiatric:         Attention and Perception: She is attentive.         Mood and Affect: Mood normal.         Speech: Speech normal.         Behavior: Behavior normal.         Recent Lab Results:     Lab Results   Component Value Date    CHOL 219 (H) 09/26/2019    TRIG 88 09/26/2019    HDL 77 09/26/2019    VLDL 17.6 09/26/2019    LDLHDL 1.62 09/26/2019     No visits with results within 1 Week(s) from this visit.   Latest known visit with results is:   Lab on 05/28/2020   Component " Date Value Ref Range Status   • Color, UA 05/28/2020 Yellow  Yellow, Straw Final   • Appearance, UA 05/28/2020 Clear  Clear Final   • pH, UA 05/28/2020 5.5  5.0 - 8.0 Final   • Specific Gravity, UA 05/28/2020 1.018  1.005 - 1.030 Final   • Glucose, UA 05/28/2020 Negative  Negative Final   • Ketones, UA 05/28/2020 Negative  Negative Final   • Bilirubin, UA 05/28/2020 Negative  Negative Final   • Blood, UA 05/28/2020 Negative  Negative Final   • Protein, UA 05/28/2020 Negative  Negative Final   • Leuk Esterase, UA 05/28/2020 Small (1+)* Negative Final   • Nitrite, UA 05/28/2020 Negative  Negative Final   • Urobilinogen, UA 05/28/2020 0.2 E.U./dL  0.2 - 1.0 E.U./dL Final   • Glucose 05/28/2020 109* 65 - 99 mg/dL Final   • BUN 05/28/2020 15  8 - 23 mg/dL Final   • Creatinine 05/28/2020 0.92  0.57 - 1.00 mg/dL Final   • Sodium 05/28/2020 139  136 - 145 mmol/L Final   • Potassium 05/28/2020 4.2  3.5 - 5.2 mmol/L Final   • Chloride 05/28/2020 103  98 - 107 mmol/L Final   • CO2 05/28/2020 23.9  22.0 - 29.0 mmol/L Final   • Calcium 05/28/2020 9.4  8.6 - 10.5 mg/dL Final   • Total Protein 05/28/2020 7.1  6.0 - 8.5 g/dL Final   • Albumin 05/28/2020 4.40  3.50 - 5.20 g/dL Final   • ALT (SGPT) 05/28/2020 97* 1 - 33 U/L Final   • AST (SGOT) 05/28/2020 95* 1 - 32 U/L Final   • Alkaline Phosphatase 05/28/2020 52  39 - 117 U/L Final   • Total Bilirubin 05/28/2020 0.6  0.2 - 1.2 mg/dL Final   • eGFR Non African Amer 05/28/2020 60* >60 mL/min/1.73 Final   • Globulin 05/28/2020 2.7  gm/dL Final   • A/G Ratio 05/28/2020 1.6  g/dL Final   • BUN/Creatinine Ratio 05/28/2020 16.3  7.0 - 25.0 Final   • Anion Gap 05/28/2020 12.1  5.0 - 15.0 mmol/L Final   • WBC 05/28/2020 5.25  3.40 - 10.80 10*3/mm3 Final   • RBC 05/28/2020 4.55  3.77 - 5.28 10*6/mm3 Final   • Hemoglobin 05/28/2020 13.0  12.0 - 15.9 g/dL Final   • Hematocrit 05/28/2020 39.8  34.0 - 46.6 % Final   • MCV 05/28/2020 87.5  79.0 - 97.0 fL Final   • MCH 05/28/2020 28.6  26.6 - 33.0  pg Final   • MCHC 05/28/2020 32.7  31.5 - 35.7 g/dL Final   • RDW 05/28/2020 15.3  12.3 - 15.4 % Final   • RDW-SD 05/28/2020 49.5  37.0 - 54.0 fl Final   • MPV 05/28/2020 11.2  6.0 - 12.0 fL Final   • Platelets 05/28/2020 224  140 - 450 10*3/mm3 Final   • Neutrophil % 05/28/2020 47.0  42.7 - 76.0 % Final   • Lymphocyte % 05/28/2020 39.4  19.6 - 45.3 % Final   • Monocyte % 05/28/2020 8.8  5.0 - 12.0 % Final   • Eosinophil % 05/28/2020 3.6  0.3 - 6.2 % Final   • Basophil % 05/28/2020 0.8  0.0 - 1.5 % Final   • Immature Grans % 05/28/2020 0.4  0.0 - 0.5 % Final   • Neutrophils, Absolute 05/28/2020 2.47  1.70 - 7.00 10*3/mm3 Final   • Lymphocytes, Absolute 05/28/2020 2.07  0.70 - 3.10 10*3/mm3 Final   • Monocytes, Absolute 05/28/2020 0.46  0.10 - 0.90 10*3/mm3 Final   • Eosinophils, Absolute 05/28/2020 0.19  0.00 - 0.40 10*3/mm3 Final   • Basophils, Absolute 05/28/2020 0.04  0.00 - 0.20 10*3/mm3 Final   • Immature Grans, Absolute 05/28/2020 0.02  0.00 - 0.05 10*3/mm3 Final   • nRBC 05/28/2020 0.0  0.0 - 0.2 /100 WBC Final   • RBC, UA 05/28/2020 None Seen  None Seen, 0-2 /HPF Final   • WBC, UA 05/28/2020 6-12* None Seen, 0-2 /HPF Final   • Bacteria, UA 05/28/2020 1+* None Seen /HPF Final   • Squamous Epithelial Cells, UA 05/28/2020 0-2  None Seen, 0-2 /HPF Final   • Hyaline Casts, UA 05/28/2020 None Seen  None Seen /LPF Final   • Methodology 05/28/2020 Manual Light Microscopy   Final   • Urine Culture 05/28/2020 25,000 CFU/mL Mixed Joanne Isolated   Final         Assessment/Plan   Age-appropriate Screening Schedule:  Refer to the list below for future screening recommendations based on patient's age, sex and/or medical conditions.      Health Maintenance   Topic Date Due   • TDAP/TD VACCINES (1 - Tdap) 10/16/1967   • DXA SCAN  06/05/2020   • LIPID PANEL  09/26/2020   • MAMMOGRAM  10/10/2021   • COLONOSCOPY  02/15/2028   • INFLUENZA VACCINE  Completed   • ZOSTER VACCINE  Completed       Medicare Risks and Personalized  Health Plan:  Advance Directive Discussion  Breast Cancer/Mammogram Screening  Diabetic Lab Screening       CMS-Preventive Services Quick Reference  Medicare Preventive Services Addressed:  Annual Wellness Visit (AWV)  Bone Density Measurements  Diabetes Screening-Lab Order for either glucose quantitative blood (except reagent strip), glucose;post glucose dose(includes glucose), or glucose tolerance test-3 specimens(includes glucose)  Screening Mammography     Advance Care Planning:  ACP discussion was held with the patient during this visit. Patient has an advance directive (not in EMR), copy requested.    There are no diagnoses linked to this encounter.    An After Visit Summary and PPPS with all of these plans were given to the patient.      Follow Up:  Return in about 1 year (around 10/20/2021) for Medicare Wellness.

## 2020-10-20 NOTE — PATIENT INSTRUCTIONS
Medicare Wellness  Personal Prevention Plan of Service     Date of Office Visit:  10/20/2020  Encounter Provider:  Luz Perdomo MD  Place of Service:  [facility]  Patient Name: Africa Licona  :  1950    As part of the Medicare Wellness portion of your visit today, we are providing you with this personalized preventive plan of services (PPPS). This plan is based upon recommendations of the [place] Preventive Services Task Force (USPSTF) and the Advisory Committee on Immunization Practices (ACIP).    This lists the preventive care services that should be considered, and provides dates of when you are due. Items listed as completed are up-to-date and do not require any further intervention.    Health Maintenance   Topic Date Due   • TDAP/TD VACCINES (1 - Tdap) 10/16/1967   • Pneumococcal Vaccine 65+ (1 of 1 - PPSV23) 10/16/2013   • HEPATITIS C SCREENING  2019   • DXA SCAN  2020   • LIPID PANEL  2020   • MAMMOGRAM  10/10/2021   • ANNUAL WELLNESS VISIT  10/20/2021   • COLONOSCOPY  02/15/2028   • INFLUENZA VACCINE  Completed   • ZOSTER VACCINE  Completed       No orders of the defined types were placed in this encounter.      Return in about 1 year (around 10/20/2021) for Medicare Wellness.

## 2020-10-23 RX ORDER — LEVOTHYROXINE SODIUM 175 UG/1
175 TABLET ORAL DAILY
Qty: 90 TABLET | Refills: 0 | Status: SHIPPED | OUTPATIENT
Start: 2020-10-23 | End: 2021-04-06

## 2020-11-09 DIAGNOSIS — Z12.31 ENCOUNTER FOR SCREENING MAMMOGRAM FOR MALIGNANT NEOPLASM OF BREAST: ICD-10-CM

## 2020-11-10 RX ORDER — ROSUVASTATIN CALCIUM 20 MG/1
20 TABLET, COATED ORAL
Qty: 90 TABLET | Refills: 3 | Status: SHIPPED | OUTPATIENT
Start: 2020-11-10 | End: 2021-10-16 | Stop reason: SDUPTHER

## 2020-11-24 RX ORDER — FLUOXETINE HYDROCHLORIDE 20 MG/1
CAPSULE ORAL
Qty: 90 CAPSULE | Refills: 1 | Status: SHIPPED | OUTPATIENT
Start: 2020-11-24 | End: 2021-05-07

## 2021-04-06 RX ORDER — LEVOTHYROXINE SODIUM 175 UG/1
175 TABLET ORAL DAILY
Qty: 90 TABLET | Refills: 0 | Status: SHIPPED | OUTPATIENT
Start: 2021-04-06 | End: 2021-07-04

## 2021-05-02 ENCOUNTER — PATIENT MESSAGE (OUTPATIENT)
Dept: FAMILY MEDICINE CLINIC | Facility: CLINIC | Age: 71
End: 2021-05-02

## 2021-05-03 RX ORDER — EPINEPHRINE 0.3 MG/.3ML
0.3 INJECTION SUBCUTANEOUS ONCE
Qty: 1 EACH | Refills: 1 | Status: SHIPPED | OUTPATIENT
Start: 2021-05-03 | End: 2021-05-03

## 2021-05-07 RX ORDER — FLUOXETINE HYDROCHLORIDE 20 MG/1
CAPSULE ORAL
Qty: 90 CAPSULE | Refills: 1 | Status: SHIPPED | OUTPATIENT
Start: 2021-05-07 | End: 2021-10-30

## 2021-05-25 RX ORDER — PANTOPRAZOLE SODIUM 40 MG/1
40 TABLET, DELAYED RELEASE ORAL DAILY
Qty: 90 TABLET | Refills: 2 | Status: SHIPPED | OUTPATIENT
Start: 2021-05-25 | End: 2021-08-18

## 2021-05-25 RX ORDER — PANTOPRAZOLE SODIUM 40 MG/1
TABLET, DELAYED RELEASE ORAL
Qty: 90 TABLET | OUTPATIENT
Start: 2021-05-25

## 2021-07-04 RX ORDER — LEVOTHYROXINE SODIUM 175 MCG
TABLET ORAL
Qty: 90 TABLET | Refills: 0 | Status: SHIPPED | OUTPATIENT
Start: 2021-07-04 | End: 2021-10-30

## 2021-08-18 RX ORDER — OMEPRAZOLE 20 MG/1
20 TABLET, DELAYED RELEASE ORAL DAILY
Qty: 90 TABLET | Refills: 1 | Status: SHIPPED | OUTPATIENT
Start: 2021-08-18 | End: 2021-11-16 | Stop reason: SDUPTHER

## 2021-09-10 ENCOUNTER — OFFICE VISIT (OUTPATIENT)
Dept: FAMILY MEDICINE CLINIC | Facility: CLINIC | Age: 71
End: 2021-09-10

## 2021-09-10 VITALS
TEMPERATURE: 97.8 F | HEIGHT: 69 IN | OXYGEN SATURATION: 97 % | SYSTOLIC BLOOD PRESSURE: 137 MMHG | WEIGHT: 183 LBS | BODY MASS INDEX: 27.11 KG/M2 | HEART RATE: 77 BPM | DIASTOLIC BLOOD PRESSURE: 87 MMHG | RESPIRATION RATE: 18 BRPM

## 2021-09-10 DIAGNOSIS — G44.009 CLUSTER HEADACHE, NOT INTRACTABLE, UNSPECIFIED CHRONICITY PATTERN: Primary | ICD-10-CM

## 2021-09-10 DIAGNOSIS — E03.9 HYPOTHYROIDISM, UNSPECIFIED TYPE: ICD-10-CM

## 2021-09-10 DIAGNOSIS — E78.5 HYPERLIPIDEMIA, UNSPECIFIED HYPERLIPIDEMIA TYPE: ICD-10-CM

## 2021-09-10 DIAGNOSIS — M17.0 PRIMARY OSTEOARTHRITIS OF BOTH KNEES: ICD-10-CM

## 2021-09-10 PROCEDURE — 99214 OFFICE O/P EST MOD 30 MIN: CPT | Performed by: FAMILY MEDICINE

## 2021-09-10 RX ORDER — METHYLPREDNISOLONE 4 MG/1
TABLET ORAL
Qty: 1 EACH | Refills: 0 | Status: SHIPPED | OUTPATIENT
Start: 2021-09-10 | End: 2021-09-27

## 2021-09-10 RX ORDER — MELOXICAM 15 MG/1
15 TABLET ORAL DAILY
Qty: 90 TABLET | Refills: 0 | Status: SHIPPED | OUTPATIENT
Start: 2021-09-10 | End: 2021-11-28 | Stop reason: SDUPTHER

## 2021-09-10 NOTE — PROGRESS NOTES
Subjective   Chief Complaint   Patient presents with   • Headache     Sx per 60 days on and off   • Osteoarthritis   • Pain     Africa Licona is a 70 y.o. female.     Patient Care Team:  Luz Perdomo MD as PCP - General  Keshawn Conrad MD as Consulting Physician (Orthopedic Surgery)    She is coming in today due to headaches, which have been going on for about 2 months on and off.  Her medical doctor is not available today and patient is being evaluated by me.  She reports that for the last couple of months she has been having recurrent headaches which come very randomly sometimes not even every day, but sometimes even few times throughout the day.  They are located on the top of the head in the middle, the intensity is typically somewhere between 2-3/10, but she had a headache last night which was much more intense and she rated it as 9/10, she typically takes Tylenol and about 30 minutes later her headache resolves, but last night it did not help and it lasted for longer.  She had few small headaches earlier today, but no headaches as being reported at the time of this evaluation.  She denies any associated symptoms, no visual problems, no nausea, vomiting, or fevers.  She typically does not get headaches and this is certainly something new for her.  She has been treated for some medical problems including chronic pain due to osteoarthritis and she is on meloxicam, she is running out of this medication and would like to get refill today.  She is also being currently treated for hyperlipidemia and hypothyroidism.  She is due for blood work and she would like me to order these test so she can follow-up on these with her medical doctor down the road.       The following portions of the patient's history were reviewed and updated as appropriate: allergies, current medications, past family history, past medical history, past social history, past surgical history and problem list.  Past Medical History:  "  Diagnosis Date   • Allergic    • Arthritis    • Cataract    • Depression    • Hyperlipidemia    • Hypothyroidism    • Osteopenia      Past Surgical History:   Procedure Laterality Date   • COLONOSCOPY  02/2018   • JOINT REPLACEMENT Left 06/03/2020    knee     The patient has a family history of  Family History   Problem Relation Age of Onset   • Hypertension Other      Social History     Socioeconomic History   • Marital status:      Spouse name: Not on file   • Number of children: Not on file   • Years of education: Not on file   • Highest education level: Not on file   Tobacco Use   • Smoking status: Former Smoker     Types: Cigarettes   • Smokeless tobacco: Never Used   • Tobacco comment: quit 25 yrs ago   Substance and Sexual Activity   • Alcohol use: Yes     Comment: caffeine 1c d       Review of Systems   Eyes: Negative for blurred vision and visual disturbance.   Gastrointestinal: Negative for nausea and vomiting.   Neurological: Positive for headache.     Visit Vitals  /87   Pulse 77   Temp 97.8 °F (36.6 °C)   Resp 18   Ht 174 cm (68.5\")   Wt 83 kg (183 lb)   SpO2 97%   BMI 27.42 kg/m²       Current Outpatient Medications:   •  Cholecalciferol (VITAMIN D3) 2000 units tablet, Daily., Disp: , Rfl:   •  Cyanocobalamin (B-12) 1000 MCG tablet, Daily., Disp: , Rfl:   •  FLUoxetine (PROzac) 20 MG capsule, TAKE 1 CAPSULE BY MOUTH EVERY DAY, Disp: 90 capsule, Rfl: 1  •  lisinopril (PRINIVIL,ZESTRIL) 10 MG tablet, TAKE 1 TABLET BY MOUTH DAILY, Disp: 90 tablet, Rfl: 0  •  meclizine (ANTIVERT) 25 MG tablet, Take 1 tablet by mouth 3 (Three) Times a Day As Needed for Dizziness., Disp: 30 tablet, Rfl: 0  •  meloxicam (MOBIC) 15 MG tablet, Take 1 tablet by mouth Daily., Disp: 90 tablet, Rfl: 0  •  methylPREDNISolone (Medrol) 4 MG dose pack, Take as directed on package instructions., Disp: 1 each, Rfl: 0  •  omeprazole OTC (PrilOSEC OTC) 20 MG EC tablet, Take 1 tablet by mouth Daily., Disp: 90 tablet, Rfl: " 1  •  rosuvastatin (CRESTOR) 20 MG tablet, Take 1 tablet by mouth every night at bedtime., Disp: 90 tablet, Rfl: 3  •  Synthroid 175 MCG tablet, TAKE 1 TABLET BY MOUTH DAILY, Disp: 90 tablet, Rfl: 0    Objective   Physical Exam  Constitutional:       General: She is not in acute distress.     Appearance: Normal appearance. She is well-developed. She is not ill-appearing or diaphoretic.      Comments: Patient is in no distress, patient has normal voice and speech.  Normal respiratory effort.   HENT:      Head: Normocephalic and atraumatic.   Pulmonary:      Effort: Pulmonary effort is normal.   Musculoskeletal:      Cervical back: Normal range of motion and neck supple.   Neurological:      General: No focal deficit present.      Mental Status: She is alert and oriented to person, place, and time. Mental status is at baseline.   Psychiatric:         Mood and Affect: Mood normal.         Assessment/Plan   Diagnoses and all orders for this visit:    1. Cluster headache, not intractable, unspecified chronicity pattern (Primary)  -     CT Head Without Contrast; Future  -     methylPREDNISolone (Medrol) 4 MG dose pack; Take as directed on package instructions.  Dispense: 1 each; Refill: 0  -     Vitamin B12    2. Hypothyroidism, unspecified type  -     CBC Auto Differential  -     Comprehensive Metabolic Panel  -     TSH    3. Hyperlipidemia, unspecified hyperlipidemia type  -     CBC Auto Differential  -     Lipid Panel    4. Primary osteoarthritis of both knees  -     meloxicam (MOBIC) 15 MG tablet; Take 1 tablet by mouth Daily.  Dispense: 90 tablet; Refill: 0      Her neurological exam today is intact.  Her headache seems to have almost neuralgic character.  I will be starting her on steroid pack.  I will be getting CT of the head.  I also refilled her meloxicam which she takes for her arthritis.  I will be getting also labs.  She was advised to monitor his symptoms and contact us back if no improvement, she was also  advised to follow-up on all tests ordered today with her medical doctor for reevaluation and further management.  She verbalized understanding.        Return in about 4 weeks (around 10/8/2021) for Next scheduled follow up.    Requested Prescriptions     Signed Prescriptions Disp Refills   • methylPREDNISolone (Medrol) 4 MG dose pack 1 each 0     Sig: Take as directed on package instructions.   • meloxicam (MOBIC) 15 MG tablet 90 tablet 0     Sig: Take 1 tablet by mouth Daily.

## 2021-09-14 ENCOUNTER — LAB (OUTPATIENT)
Dept: FAMILY MEDICINE CLINIC | Facility: CLINIC | Age: 71
End: 2021-09-14

## 2021-09-14 LAB
ALBUMIN SERPL-MCNC: 4.8 G/DL (ref 3.5–5.2)
ALBUMIN/GLOB SERPL: 1.6 G/DL
ALP SERPL-CCNC: 53 U/L (ref 39–117)
ALT SERPL W P-5'-P-CCNC: 47 U/L (ref 1–33)
ANION GAP SERPL CALCULATED.3IONS-SCNC: 9.6 MMOL/L (ref 5–15)
AST SERPL-CCNC: 38 U/L (ref 1–32)
BASOPHILS # BLD AUTO: 0.04 10*3/MM3 (ref 0–0.2)
BASOPHILS NFR BLD AUTO: 0.5 % (ref 0–1.5)
BILIRUB SERPL-MCNC: 0.3 MG/DL (ref 0–1.2)
BUN SERPL-MCNC: 31 MG/DL (ref 8–23)
BUN/CREAT SERPL: 38.3 (ref 7–25)
CALCIUM SPEC-SCNC: 10 MG/DL (ref 8.6–10.5)
CHLORIDE SERPL-SCNC: 100 MMOL/L (ref 98–107)
CHOLEST SERPL-MCNC: 211 MG/DL (ref 0–200)
CO2 SERPL-SCNC: 26.4 MMOL/L (ref 22–29)
CREAT SERPL-MCNC: 0.81 MG/DL (ref 0.57–1)
DEPRECATED RDW RBC AUTO: 49.2 FL (ref 37–54)
EOSINOPHIL # BLD AUTO: 0.03 10*3/MM3 (ref 0–0.4)
EOSINOPHIL NFR BLD AUTO: 0.4 % (ref 0.3–6.2)
ERYTHROCYTE [DISTWIDTH] IN BLOOD BY AUTOMATED COUNT: 14.5 % (ref 12.3–15.4)
GFR SERPL CREATININE-BSD FRML MDRD: 70 ML/MIN/1.73
GLOBULIN UR ELPH-MCNC: 3 GM/DL
GLUCOSE SERPL-MCNC: 93 MG/DL (ref 65–99)
HCT VFR BLD AUTO: 44.7 % (ref 34–46.6)
HDLC SERPL-MCNC: 82 MG/DL (ref 40–60)
HGB BLD-MCNC: 14.2 G/DL (ref 12–15.9)
IMM GRANULOCYTES # BLD AUTO: 0.02 10*3/MM3 (ref 0–0.05)
IMM GRANULOCYTES NFR BLD AUTO: 0.3 % (ref 0–0.5)
LDLC SERPL CALC-MCNC: 118 MG/DL (ref 0–100)
LDLC/HDLC SERPL: 1.42 {RATIO}
LYMPHOCYTES # BLD AUTO: 2.65 10*3/MM3 (ref 0.7–3.1)
LYMPHOCYTES NFR BLD AUTO: 35.4 % (ref 19.6–45.3)
MCH RBC QN AUTO: 29.6 PG (ref 26.6–33)
MCHC RBC AUTO-ENTMCNC: 31.8 G/DL (ref 31.5–35.7)
MCV RBC AUTO: 93.1 FL (ref 79–97)
MONOCYTES # BLD AUTO: 0.47 10*3/MM3 (ref 0.1–0.9)
MONOCYTES NFR BLD AUTO: 6.3 % (ref 5–12)
NEUTROPHILS NFR BLD AUTO: 4.28 10*3/MM3 (ref 1.7–7)
NEUTROPHILS NFR BLD AUTO: 57.1 % (ref 42.7–76)
NRBC BLD AUTO-RTO: 0 /100 WBC (ref 0–0.2)
PLATELET # BLD AUTO: 212 10*3/MM3 (ref 140–450)
PMV BLD AUTO: 12.5 FL (ref 6–12)
POTASSIUM SERPL-SCNC: 4.2 MMOL/L (ref 3.5–5.2)
PROT SERPL-MCNC: 7.8 G/DL (ref 6–8.5)
RBC # BLD AUTO: 4.8 10*6/MM3 (ref 3.77–5.28)
SODIUM SERPL-SCNC: 136 MMOL/L (ref 136–145)
TRIGL SERPL-MCNC: 62 MG/DL (ref 0–150)
TSH SERPL DL<=0.05 MIU/L-ACNC: 0.92 UIU/ML (ref 0.27–4.2)
VIT B12 BLD-MCNC: 1205 PG/ML (ref 211–946)
VLDLC SERPL-MCNC: 11 MG/DL (ref 5–40)
WBC # BLD AUTO: 7.49 10*3/MM3 (ref 3.4–10.8)

## 2021-09-14 PROCEDURE — 80053 COMPREHEN METABOLIC PANEL: CPT | Performed by: FAMILY MEDICINE

## 2021-09-14 PROCEDURE — 84443 ASSAY THYROID STIM HORMONE: CPT | Performed by: FAMILY MEDICINE

## 2021-09-14 PROCEDURE — 80061 LIPID PANEL: CPT | Performed by: FAMILY MEDICINE

## 2021-09-14 PROCEDURE — 85025 COMPLETE CBC W/AUTO DIFF WBC: CPT | Performed by: FAMILY MEDICINE

## 2021-09-14 PROCEDURE — 36415 COLL VENOUS BLD VENIPUNCTURE: CPT | Performed by: FAMILY MEDICINE

## 2021-09-14 PROCEDURE — 82607 VITAMIN B-12: CPT | Performed by: FAMILY MEDICINE

## 2021-09-15 ENCOUNTER — HOSPITAL ENCOUNTER (OUTPATIENT)
Dept: CT IMAGING | Facility: HOSPITAL | Age: 71
Discharge: HOME | End: 2021-09-15
Admitting: FAMILY MEDICINE

## 2021-09-15 DIAGNOSIS — G44.009 CLUSTER HEADACHE, NOT INTRACTABLE, UNSPECIFIED CHRONICITY PATTERN: ICD-10-CM

## 2021-09-15 PROCEDURE — 70450 CT HEAD/BRAIN W/O DYE: CPT

## 2021-09-27 ENCOUNTER — OFFICE VISIT (OUTPATIENT)
Dept: FAMILY MEDICINE CLINIC | Facility: CLINIC | Age: 71
End: 2021-09-27

## 2021-09-27 VITALS
BODY MASS INDEX: 27.11 KG/M2 | HEART RATE: 82 BPM | WEIGHT: 183 LBS | RESPIRATION RATE: 18 BRPM | HEIGHT: 69 IN | OXYGEN SATURATION: 96 % | SYSTOLIC BLOOD PRESSURE: 125 MMHG | TEMPERATURE: 96.9 F | DIASTOLIC BLOOD PRESSURE: 76 MMHG

## 2021-09-27 DIAGNOSIS — E78.5 HYPERLIPIDEMIA, UNSPECIFIED HYPERLIPIDEMIA TYPE: ICD-10-CM

## 2021-09-27 DIAGNOSIS — G44.009 CLUSTER HEADACHE, NOT INTRACTABLE, UNSPECIFIED CHRONICITY PATTERN: Primary | ICD-10-CM

## 2021-09-27 DIAGNOSIS — I10 HYPERTENSION, UNSPECIFIED TYPE: ICD-10-CM

## 2021-09-27 DIAGNOSIS — Z12.31 ENCOUNTER FOR SCREENING MAMMOGRAM FOR MALIGNANT NEOPLASM OF BREAST: ICD-10-CM

## 2021-09-27 PROBLEM — R21 RASH OF FACE: Status: RESOLVED | Noted: 2020-04-20 | Resolved: 2021-09-27

## 2021-09-27 PROCEDURE — 99214 OFFICE O/P EST MOD 30 MIN: CPT | Performed by: FAMILY MEDICINE

## 2021-10-16 RX ORDER — ROSUVASTATIN CALCIUM 20 MG/1
20 TABLET, COATED ORAL
Qty: 90 TABLET | Refills: 0
Start: 2021-10-16 | End: 2021-10-30

## 2021-10-29 DIAGNOSIS — E78.5 HYPERLIPIDEMIA, UNSPECIFIED HYPERLIPIDEMIA TYPE: ICD-10-CM

## 2021-10-30 RX ORDER — LEVOTHYROXINE SODIUM 150 MCG
150 TABLET ORAL DAILY
Qty: 30 TABLET | Refills: 5 | OUTPATIENT
Start: 2021-10-30

## 2021-10-30 RX ORDER — LEVOTHYROXINE SODIUM 175 UG/1
TABLET ORAL
Qty: 90 TABLET | Refills: 0 | Status: SHIPPED | OUTPATIENT
Start: 2021-10-30 | End: 2022-01-27 | Stop reason: SDUPTHER

## 2021-10-30 RX ORDER — ROSUVASTATIN CALCIUM 20 MG/1
20 TABLET, COATED ORAL
Qty: 90 TABLET | Refills: 0 | Status: SHIPPED | OUTPATIENT
Start: 2021-10-30 | End: 2022-01-27 | Stop reason: SDUPTHER

## 2021-10-30 RX ORDER — FLUOXETINE HYDROCHLORIDE 20 MG/1
CAPSULE ORAL
Qty: 90 CAPSULE | Refills: 1 | Status: SHIPPED | OUTPATIENT
Start: 2021-10-30 | End: 2022-04-27

## 2021-10-31 ENCOUNTER — PATIENT MESSAGE (OUTPATIENT)
Dept: FAMILY MEDICINE CLINIC | Facility: CLINIC | Age: 71
End: 2021-10-31

## 2021-11-05 RX ORDER — LISINOPRIL 10 MG/1
10 TABLET ORAL DAILY
Qty: 90 TABLET | Refills: 1 | Status: SHIPPED | OUTPATIENT
Start: 2021-11-05 | End: 2022-06-05

## 2021-11-09 ENCOUNTER — HOSPITAL ENCOUNTER (EMERGENCY)
Facility: HOSPITAL | Age: 71
Discharge: HOME | End: 2021-11-10
Attending: EMERGENCY MEDICINE | Admitting: EMERGENCY MEDICINE

## 2021-11-09 ENCOUNTER — APPOINTMENT (OUTPATIENT)
Dept: GENERAL RADIOLOGY | Facility: HOSPITAL | Age: 71
End: 2021-11-09

## 2021-11-09 DIAGNOSIS — R06.00 DYSPNEA, UNSPECIFIED TYPE: Primary | ICD-10-CM

## 2021-11-09 DIAGNOSIS — Z20.822 LAB TEST NEGATIVE FOR COVID-19 VIRUS: ICD-10-CM

## 2021-11-09 LAB
ANION GAP SERPL CALCULATED.3IONS-SCNC: 17 MMOL/L (ref 5–15)
BASOPHILS # BLD AUTO: 0.1 10*3/MM3 (ref 0–0.2)
BASOPHILS NFR BLD AUTO: 0.9 % (ref 0–1.5)
BUN SERPL-MCNC: 19 MG/DL (ref 8–23)
BUN/CREAT SERPL: 26.4 (ref 7–25)
CALCIUM SPEC-SCNC: 9.2 MG/DL (ref 8.6–10.5)
CHLORIDE SERPL-SCNC: 100 MMOL/L (ref 98–107)
CO2 SERPL-SCNC: 22 MMOL/L (ref 22–29)
CREAT SERPL-MCNC: 0.72 MG/DL (ref 0.57–1)
D DIMER PPP FEU-MCNC: 0.31 MG/L (FEU) (ref 0–0.59)
DEPRECATED RDW RBC AUTO: 48.6 FL (ref 37–54)
EOSINOPHIL # BLD AUTO: 0.3 10*3/MM3 (ref 0–0.4)
EOSINOPHIL NFR BLD AUTO: 3.4 % (ref 0.3–6.2)
ERYTHROCYTE [DISTWIDTH] IN BLOOD BY AUTOMATED COUNT: 15 % (ref 12.3–15.4)
GFR SERPL CREATININE-BSD FRML MDRD: 79 ML/MIN/1.73
GLUCOSE SERPL-MCNC: 78 MG/DL (ref 65–99)
HCT VFR BLD AUTO: 41 % (ref 34–46.6)
HGB BLD-MCNC: 14.2 G/DL (ref 12–15.9)
LYMPHOCYTES # BLD AUTO: 3 10*3/MM3 (ref 0.7–3.1)
LYMPHOCYTES NFR BLD AUTO: 41 % (ref 19.6–45.3)
MCH RBC QN AUTO: 31.8 PG (ref 26.6–33)
MCHC RBC AUTO-ENTMCNC: 34.6 G/DL (ref 31.5–35.7)
MCV RBC AUTO: 92.1 FL (ref 79–97)
MONOCYTES # BLD AUTO: 0.7 10*3/MM3 (ref 0.1–0.9)
MONOCYTES NFR BLD AUTO: 9.5 % (ref 5–12)
NEUTROPHILS NFR BLD AUTO: 3.3 10*3/MM3 (ref 1.7–7)
NEUTROPHILS NFR BLD AUTO: 45.2 % (ref 42.7–76)
NRBC BLD AUTO-RTO: 0 /100 WBC (ref 0–0.2)
NT-PROBNP SERPL-MCNC: 91.8 PG/ML (ref 0–900)
PLATELET # BLD AUTO: 189 10*3/MM3 (ref 140–450)
PMV BLD AUTO: 8.5 FL (ref 6–12)
POTASSIUM SERPL-SCNC: 4.4 MMOL/L (ref 3.5–5.2)
PROCALCITONIN SERPL-MCNC: 0.06 NG/ML (ref 0–0.25)
RBC # BLD AUTO: 4.45 10*6/MM3 (ref 3.77–5.28)
SARS-COV-2 RNA PNL SPEC NAA+PROBE: NOT DETECTED
SODIUM SERPL-SCNC: 139 MMOL/L (ref 136–145)
TROPONIN T SERPL-MCNC: <0.01 NG/ML (ref 0–0.03)
WBC # BLD AUTO: 7.4 10*3/MM3 (ref 3.4–10.8)

## 2021-11-09 PROCEDURE — 87635 SARS-COV-2 COVID-19 AMP PRB: CPT | Performed by: EMERGENCY MEDICINE

## 2021-11-09 PROCEDURE — 85379 FIBRIN DEGRADATION QUANT: CPT | Performed by: EMERGENCY MEDICINE

## 2021-11-09 PROCEDURE — 80048 BASIC METABOLIC PNL TOTAL CA: CPT | Performed by: EMERGENCY MEDICINE

## 2021-11-09 PROCEDURE — 99283 EMERGENCY DEPT VISIT LOW MDM: CPT

## 2021-11-09 PROCEDURE — 83880 ASSAY OF NATRIURETIC PEPTIDE: CPT | Performed by: EMERGENCY MEDICINE

## 2021-11-09 PROCEDURE — 84484 ASSAY OF TROPONIN QUANT: CPT | Performed by: EMERGENCY MEDICINE

## 2021-11-09 PROCEDURE — 93005 ELECTROCARDIOGRAM TRACING: CPT | Performed by: EMERGENCY MEDICINE

## 2021-11-09 PROCEDURE — 71045 X-RAY EXAM CHEST 1 VIEW: CPT

## 2021-11-09 PROCEDURE — 85025 COMPLETE CBC W/AUTO DIFF WBC: CPT | Performed by: EMERGENCY MEDICINE

## 2021-11-09 PROCEDURE — 84145 PROCALCITONIN (PCT): CPT | Performed by: EMERGENCY MEDICINE

## 2021-11-10 VITALS
OXYGEN SATURATION: 90 % | BODY MASS INDEX: 28.58 KG/M2 | HEIGHT: 67 IN | TEMPERATURE: 97.3 F | WEIGHT: 182.1 LBS | RESPIRATION RATE: 16 BRPM | SYSTOLIC BLOOD PRESSURE: 102 MMHG | DIASTOLIC BLOOD PRESSURE: 61 MMHG | HEART RATE: 67 BPM

## 2021-11-10 LAB — HOLD SPECIMEN: NORMAL

## 2021-11-10 NOTE — ED PROVIDER NOTES
Subjective   71-year-old female complains of moderate shortness of air for the past 2 hours.  Patient tells me she feels like she just cannot take a deep breath but cannot give me a reason why she cannot.  Patient denies any pain prohibiting her from breathing, any associated cough or fever.  No history of DVT or PE, no recent trips or travel, no calf pain or swelling.  Patient has had sarcoidosis in the past but denies any chronic issues with this or other chronic lung issues.  Patient states the shortness of air started at rest and there were no events of any significance today.          Review of Systems   Respiratory: Positive for shortness of breath.    All other systems reviewed and are negative.      Past Medical History:   Diagnosis Date   • Allergic    • Arthritis    • Cataract    • Depression    • Headache Spring 2021   • HL (hearing loss) 2019   • Hyperlipidemia    • Hypertension    • Hyperthyroidism    • Hypothyroidism    • Osteopenia        Allergies   Allergen Reactions   • Other Unknown (See Comments)     Wasps     • Cefazolin Itching       Past Surgical History:   Procedure Laterality Date   • ADENOIDECTOMY  1953   • COLONOSCOPY  02/2018   • COSMETIC SURGERY  1965   • ENDOMETRIAL ABLATION  1982   • FRACTURE SURGERY  1965   • JOINT REPLACEMENT Left 06/03/2020    knee   • TONSILLECTOMY  1953   • TUBAL ABDOMINAL LIGATION  1982       Family History   Problem Relation Age of Onset   • Hypertension Other        Social History     Socioeconomic History   • Marital status:    Tobacco Use   • Smoking status: Former Smoker     Packs/day: 1.50     Years: 15.00     Pack years: 22.50     Types: Cigarettes   • Smokeless tobacco: Never Used   • Tobacco comment: quit 25 yrs ago   Vaping Use   • Vaping Use: Never used   Substance and Sexual Activity   • Alcohol use: Not Currently     Alcohol/week: 14.0 standard drinks     Types: 4 Glasses of wine, 10 Shots of liquor per week     Comment: Currently abstinent to  try to lower liver numbers   • Drug use: Never   • Sexual activity: Yes     Partners: Female           Objective   Physical Exam  Constitutional:       Appearance: Normal appearance.   HENT:      Head: Normocephalic and atraumatic.      Mouth/Throat:      Mouth: Mucous membranes are moist.      Pharynx: Oropharynx is clear.   Eyes:      Conjunctiva/sclera: Conjunctivae normal.      Pupils: Pupils are equal, round, and reactive to light.   Cardiovascular:      Rate and Rhythm: Normal rate and regular rhythm.      Heart sounds: Normal heart sounds.   Pulmonary:      Effort: Pulmonary effort is normal.      Breath sounds: Normal breath sounds.   Abdominal:      General: Bowel sounds are normal. There is no distension.      Palpations: Abdomen is soft.      Tenderness: There is no abdominal tenderness.   Musculoskeletal:         General: No swelling or tenderness. Normal range of motion.   Skin:     General: Skin is warm and dry.      Capillary Refill: Capillary refill takes less than 2 seconds.   Neurological:      General: No focal deficit present.      Mental Status: She is alert and oriented to person, place, and time.         Procedures           ED Course  ED Course as of 11/10/21 0014   Tue Nov 09, 2021   2234 EKG interpretation: Sinus rhythm, rate 77, no acute ST change [JR]      ED Course User Index  [JR] Spenser Garcia MD                                           [facility]  Number of Diagnoses or Management Options  Dyspnea, unspecified type  Lab test negative for COVID-19 virus  Diagnosis management comments: Results for orders placed or performed during the hospital encounter of 11/09/21  -COVID-19,CEPHEID/PANKAJ/BDMAX,COR/DEYSI/PAD/ZAC IN-HOUSE(OR EMERGENT/ADD-ON),NP SWAB IN TRANSPORT MEDIA 3-4 HR TAT, RT-PCR - Swab, Nasopharynx:   Specimen: Nasopharynx; Swab       Result                      Value             Ref Range           COVID19                     Not Detected      Not Detected*  -Procalcitonin:    Specimen: Blood       Result                      Value             Ref Range           Procalcitonin               0.06              0.00 - 0.25 *  -D-dimer, Quantitative:   Specimen: Blood       Result                      Value             Ref Range           D-Dimer, Quantitative       0.31              0.00 - 0.59 *  -Basic Metabolic Panel:   Specimen: Blood       Result                      Value             Ref Range           Glucose                     78                65 - 99 mg/dL       BUN                         19                8 - 23 mg/dL        Creatinine                  0.72              0.57 - 1.00 *       Sodium                      139               136 - 145 mm*       Potassium                   4.4               3.5 - 5.2 mm*       Chloride                    100               98 - 107 mmo*       CO2                         22.0              22.0 - 29.0 *       Calcium                     9.2               8.6 - 10.5 m*       eGFR Non African Amer       79                >60 mL/min/1*       BUN/Creatinine Ratio        26.4 (H)          7.0 - 25.0          Anion Gap                   17.0 (H)          5.0 - 15.0 m*  -BNP:   Specimen: Blood       Result                      Value             Ref Range           proBNP                      91.8              0.0 - 900.0 *  -Troponin:   Specimen: Blood       Result                      Value             Ref Range           Troponin T                  <0.010            0.000 - 0.03*  -CBC Auto Differential:   Specimen: Blood       Result                      Value             Ref Range           WBC                         7.40              3.40 - 10.80*       RBC                         4.45              3.77 - 5.28 *       Hemoglobin                  14.2              12.0 - 15.9 *       Hematocrit                  41.0              34.0 - 46.6 %       MCV                         92.1              79.0 - 97.0 *       MCH                          31.8              26.6 - 33.0 *       MCHC                        34.6              31.5 - 35.7 *       RDW                         15.0              12.3 - 15.4 %       RDW-SD                      48.6              37.0 - 54.0 *       MPV                         8.5               6.0 - 12.0 fL       Platelets                   189               140 - 450 10*       Neutrophil %                45.2              42.7 - 76.0 %       Lymphocyte %                41.0              19.6 - 45.3 %       Monocyte %                  9.5               5.0 - 12.0 %        Eosinophil %                3.4               0.3 - 6.2 %         Basophil %                  0.9               0.0 - 1.5 %         Neutrophils, Absolute       3.30              1.70 - 7.00 *       Lymphocytes, Absolute       3.00              0.70 - 3.10 *       Monocytes, Absolute         0.70              0.10 - 0.90 *       Eosinophils, Absolute       0.30              0.00 - 0.40 *       Basophils, Absolute         0.10              0.00 - 0.20 *       nRBC                        0.0               0.0 - 0.2 /1*  -Gold Top - SST:        Result                      Value             Ref Range           Extra Tube                                                    Hold for add-ons.  XR Chest 1 View   Final Result         1. No active disease.    2. Moderate to large hiatal hernia.         Electronically Signed By-Francisco Gupta MD On:11/9/2021 10:59 PM    This report was finalized on [id number] by  Francisco Gupta MD.     Patient appears well, feels better, clarifies this is not worse with exertion, no clear etiology.  I did  the patient if this gets worse or she has any chest pain or fever or exertional symptoms to return to the ED, otherwise follow-up closely with her family physician.  Anxiety is in the differential and patient does report stress but the etiology is not clear at this time.       Amount and/or Complexity of Data Reviewed  Clinical lab  tests: ordered and reviewed  Tests in the radiology section of CPT®: ordered and reviewed        Final diagnoses:   Dyspnea, unspecified type   Lab test negative for COVID-19 virus       ED Disposition  ED Disposition     ED Disposition Condition Comment    Discharge Stable           Luz Perdomo MD  [address] [address]  [phone]    Schedule an appointment as soon as possible for a visit            Medication List      No changes were made to your prescriptions during this visit.          Spenser Garcia MD  11/10/21 0014

## 2021-11-11 LAB — QT INTERVAL: 411 MS

## 2021-11-16 ENCOUNTER — OFFICE VISIT (OUTPATIENT)
Dept: FAMILY MEDICINE CLINIC | Facility: CLINIC | Age: 71
End: 2021-11-16

## 2021-11-16 VITALS
WEIGHT: 185 LBS | HEART RATE: 77 BPM | TEMPERATURE: 97.8 F | OXYGEN SATURATION: 97 % | DIASTOLIC BLOOD PRESSURE: 96 MMHG | SYSTOLIC BLOOD PRESSURE: 152 MMHG | BODY MASS INDEX: 28.98 KG/M2

## 2021-11-16 DIAGNOSIS — K44.9 HIATAL HERNIA: Primary | ICD-10-CM

## 2021-11-16 PROCEDURE — 99213 OFFICE O/P EST LOW 20 MIN: CPT | Performed by: FAMILY MEDICINE

## 2021-11-16 RX ORDER — OMEPRAZOLE 20 MG/1
20 CAPSULE, DELAYED RELEASE ORAL DAILY
COMMUNITY
Start: 2021-10-29 | End: 2022-01-27 | Stop reason: SDUPTHER

## 2021-11-28 DIAGNOSIS — M17.0 PRIMARY OSTEOARTHRITIS OF BOTH KNEES: ICD-10-CM

## 2021-11-28 RX ORDER — MELOXICAM 15 MG/1
15 TABLET ORAL DAILY
Qty: 90 TABLET | Refills: 0 | Status: SHIPPED | OUTPATIENT
Start: 2021-11-28 | End: 2022-02-26

## 2021-11-28 RX ORDER — MELOXICAM 15 MG/1
15 TABLET ORAL DAILY
Qty: 90 TABLET | Refills: 0 | OUTPATIENT
Start: 2021-11-28

## 2021-12-03 ENCOUNTER — OFFICE (AMBULATORY)
Dept: URBAN - METROPOLITAN AREA PATHOLOGY 4 | Facility: PATHOLOGY | Age: 71
End: 2021-12-03
Payer: COMMERCIAL

## 2021-12-03 ENCOUNTER — OFFICE (AMBULATORY)
Dept: URBAN - METROPOLITAN AREA PATHOLOGY 4 | Facility: PATHOLOGY | Age: 71
End: 2021-12-03

## 2021-12-03 ENCOUNTER — ON CAMPUS - OUTPATIENT (AMBULATORY)
Dept: URBAN - METROPOLITAN AREA HOSPITAL 2 | Facility: HOSPITAL | Age: 71
End: 2021-12-03

## 2021-12-03 VITALS
SYSTOLIC BLOOD PRESSURE: 152 MMHG | TEMPERATURE: 96.9 F | RESPIRATION RATE: 16 BRPM | SYSTOLIC BLOOD PRESSURE: 113 MMHG | SYSTOLIC BLOOD PRESSURE: 132 MMHG | HEART RATE: 64 BPM | DIASTOLIC BLOOD PRESSURE: 59 MMHG | SYSTOLIC BLOOD PRESSURE: 116 MMHG | WEIGHT: 179 LBS | RESPIRATION RATE: 18 BRPM | HEART RATE: 72 BPM | HEART RATE: 67 BPM | SYSTOLIC BLOOD PRESSURE: 142 MMHG | DIASTOLIC BLOOD PRESSURE: 84 MMHG | DIASTOLIC BLOOD PRESSURE: 86 MMHG | SYSTOLIC BLOOD PRESSURE: 104 MMHG | OXYGEN SATURATION: 97 % | HEART RATE: 83 BPM | DIASTOLIC BLOOD PRESSURE: 78 MMHG | OXYGEN SATURATION: 99 % | OXYGEN SATURATION: 96 % | DIASTOLIC BLOOD PRESSURE: 64 MMHG | SYSTOLIC BLOOD PRESSURE: 114 MMHG | HEIGHT: 67 IN | DIASTOLIC BLOOD PRESSURE: 68 MMHG | SYSTOLIC BLOOD PRESSURE: 123 MMHG | HEART RATE: 87 BPM | HEART RATE: 66 BPM | OXYGEN SATURATION: 98 %

## 2021-12-03 DIAGNOSIS — K26.9 DUODENAL ULCER, UNSPECIFIED AS ACUTE OR CHRONIC, WITHOUT HEM: ICD-10-CM

## 2021-12-03 DIAGNOSIS — K44.9 DIAPHRAGMATIC HERNIA WITHOUT OBSTRUCTION OR GANGRENE: ICD-10-CM

## 2021-12-03 DIAGNOSIS — K31.89 OTHER DISEASES OF STOMACH AND DUODENUM: ICD-10-CM

## 2021-12-03 DIAGNOSIS — K22.2 ESOPHAGEAL OBSTRUCTION: ICD-10-CM

## 2021-12-03 DIAGNOSIS — R13.10 DYSPHAGIA, UNSPECIFIED: ICD-10-CM

## 2021-12-03 DIAGNOSIS — K31.7 POLYP OF STOMACH AND DUODENUM: ICD-10-CM

## 2021-12-03 DIAGNOSIS — K21.9 GASTRO-ESOPHAGEAL REFLUX DISEASE WITHOUT ESOPHAGITIS: ICD-10-CM

## 2021-12-03 LAB
GI HISTOLOGY: A. SELECT: (no result)
GI HISTOLOGY: B. SELECT: (no result)
GI HISTOLOGY: PDF REPORT: (no result)

## 2021-12-03 PROCEDURE — 88305 TISSUE EXAM BY PATHOLOGIST: CPT | Mod: Q6,26 | Performed by: INTERNAL MEDICINE

## 2021-12-03 PROCEDURE — 43239 EGD BIOPSY SINGLE/MULTIPLE: CPT | Mod: 59 | Performed by: INTERNAL MEDICINE

## 2021-12-03 PROCEDURE — 43249 ESOPH EGD DILATION <30 MM: CPT | Performed by: INTERNAL MEDICINE

## 2021-12-03 PROCEDURE — 88305 TISSUE EXAM BY PATHOLOGIST: CPT | Mod: 26,Q6 | Performed by: INTERNAL MEDICINE

## 2022-01-27 DIAGNOSIS — E78.5 HYPERLIPIDEMIA, UNSPECIFIED HYPERLIPIDEMIA TYPE: ICD-10-CM

## 2022-01-27 RX ORDER — ROSUVASTATIN CALCIUM 20 MG/1
20 TABLET, COATED ORAL
Qty: 90 TABLET | Refills: 0 | Status: SHIPPED | OUTPATIENT
Start: 2022-01-27 | End: 2022-04-27

## 2022-01-27 RX ORDER — LEVOTHYROXINE SODIUM 175 UG/1
TABLET ORAL
Qty: 90 TABLET | Refills: 0 | Status: SHIPPED | OUTPATIENT
Start: 2022-01-27 | End: 2022-04-27

## 2022-01-27 RX ORDER — OMEPRAZOLE 20 MG/1
CAPSULE, DELAYED RELEASE ORAL
Qty: 90 CAPSULE | Refills: 1 | Status: SHIPPED | OUTPATIENT
Start: 2022-01-27 | End: 2022-07-26

## 2022-02-26 DIAGNOSIS — M17.0 PRIMARY OSTEOARTHRITIS OF BOTH KNEES: ICD-10-CM

## 2022-02-26 RX ORDER — MELOXICAM 15 MG/1
15 TABLET ORAL DAILY
Qty: 90 TABLET | Refills: 0 | Status: SHIPPED | OUTPATIENT
Start: 2022-02-26

## 2022-04-27 DIAGNOSIS — E78.5 HYPERLIPIDEMIA, UNSPECIFIED HYPERLIPIDEMIA TYPE: ICD-10-CM

## 2022-04-27 RX ORDER — FLUOXETINE HYDROCHLORIDE 20 MG/1
CAPSULE ORAL
Qty: 90 CAPSULE | Refills: 1 | Status: SHIPPED | OUTPATIENT
Start: 2022-04-27 | End: 2022-10-24

## 2022-04-27 RX ORDER — LEVOTHYROXINE SODIUM 175 UG/1
TABLET ORAL
Qty: 90 TABLET | Refills: 1 | Status: SHIPPED | OUTPATIENT
Start: 2022-04-27 | End: 2022-10-24

## 2022-04-27 RX ORDER — ROSUVASTATIN CALCIUM 20 MG/1
20 TABLET, COATED ORAL
Qty: 90 TABLET | Refills: 1 | Status: SHIPPED | OUTPATIENT
Start: 2022-04-27 | End: 2022-10-24

## 2022-06-05 RX ORDER — LISINOPRIL 10 MG/1
10 TABLET ORAL DAILY
Qty: 90 TABLET | Refills: 1 | Status: SHIPPED | OUTPATIENT
Start: 2022-06-05 | End: 2022-09-11 | Stop reason: SDUPTHER

## 2022-06-26 RX ORDER — PANTOPRAZOLE SODIUM 40 MG/1
40 TABLET, DELAYED RELEASE ORAL DAILY
Qty: 90 TABLET | Refills: 2 | OUTPATIENT
Start: 2022-06-26

## 2022-07-26 RX ORDER — OMEPRAZOLE 20 MG/1
CAPSULE, DELAYED RELEASE ORAL
Qty: 90 CAPSULE | Refills: 1 | Status: SHIPPED | OUTPATIENT
Start: 2022-07-26

## 2022-09-12 RX ORDER — LISINOPRIL 10 MG/1
10 TABLET ORAL DAILY
Qty: 90 TABLET | Refills: 0 | Status: SHIPPED | OUTPATIENT
Start: 2022-09-12 | End: 2022-12-16

## 2022-10-24 DIAGNOSIS — E78.5 HYPERLIPIDEMIA, UNSPECIFIED HYPERLIPIDEMIA TYPE: ICD-10-CM

## 2022-10-24 RX ORDER — LEVOTHYROXINE SODIUM 175 UG/1
TABLET ORAL
Qty: 90 TABLET | Refills: 0 | Status: SHIPPED | OUTPATIENT
Start: 2022-10-24

## 2022-10-24 RX ORDER — FLUOXETINE HYDROCHLORIDE 20 MG/1
CAPSULE ORAL
Qty: 90 CAPSULE | Refills: 0 | Status: SHIPPED | OUTPATIENT
Start: 2022-10-24

## 2022-10-24 RX ORDER — ROSUVASTATIN CALCIUM 20 MG/1
20 TABLET, COATED ORAL
Qty: 90 TABLET | Refills: 0 | Status: SHIPPED | OUTPATIENT
Start: 2022-10-24

## 2022-12-16 RX ORDER — LISINOPRIL 10 MG/1
10 TABLET ORAL DAILY
Qty: 90 TABLET | Refills: 0 | Status: SHIPPED | OUTPATIENT
Start: 2022-12-16

## 2023-04-22 DIAGNOSIS — E78.5 HYPERLIPIDEMIA, UNSPECIFIED HYPERLIPIDEMIA TYPE: ICD-10-CM

## 2023-04-24 RX ORDER — ROSUVASTATIN CALCIUM 20 MG/1
20 TABLET, COATED ORAL
Qty: 90 TABLET | Refills: 0 | Status: SHIPPED | OUTPATIENT
Start: 2023-04-24

## 2023-04-24 RX ORDER — FLUOXETINE HYDROCHLORIDE 20 MG/1
CAPSULE ORAL
Qty: 90 CAPSULE | Refills: 0 | Status: SHIPPED | OUTPATIENT
Start: 2023-04-24

## 2023-04-24 RX ORDER — LEVOTHYROXINE SODIUM 175 UG/1
TABLET ORAL
Qty: 90 TABLET | Refills: 0 | Status: SHIPPED | OUTPATIENT
Start: 2023-04-24

## 2023-04-24 NOTE — TELEPHONE ENCOUNTER
I refilled her medicines but please let her know that it has been over a year since she has been here.  If she needs additional refills she will need to schedule an appointment to see me.

## 2023-04-24 NOTE — TELEPHONE ENCOUNTER
I called and LM that patient medications were filled for a 3 month supply but she has to make an appt before any further refills because it has been a year since she has been seen in the office

## 2023-06-06 ENCOUNTER — APPOINTMENT (OUTPATIENT)
Dept: GENERAL RADIOLOGY | Facility: HOSPITAL | Age: 73
End: 2023-06-06
Payer: MEDICARE

## 2023-06-06 ENCOUNTER — HOSPITAL ENCOUNTER (EMERGENCY)
Facility: HOSPITAL | Age: 73
Discharge: HOME | End: 2023-06-06
Attending: EMERGENCY MEDICINE
Payer: MEDICARE

## 2023-06-06 VITALS
OXYGEN SATURATION: 96 % | DIASTOLIC BLOOD PRESSURE: 89 MMHG | RESPIRATION RATE: 16 BRPM | TEMPERATURE: 98.6 F | BODY MASS INDEX: 29 KG/M2 | WEIGHT: 184.75 LBS | SYSTOLIC BLOOD PRESSURE: 142 MMHG | HEART RATE: 82 BPM | HEIGHT: 67 IN

## 2023-06-06 DIAGNOSIS — M25.531 BILATERAL WRIST PAIN: Primary | ICD-10-CM

## 2023-06-06 DIAGNOSIS — M25.532 BILATERAL WRIST PAIN: Primary | ICD-10-CM

## 2023-06-06 DIAGNOSIS — S66.911A STRAIN OF RIGHT WRIST, INITIAL ENCOUNTER: ICD-10-CM

## 2023-06-06 DIAGNOSIS — S66.912A STRAIN OF LEFT WRIST, INITIAL ENCOUNTER: ICD-10-CM

## 2023-06-06 PROCEDURE — 99283 EMERGENCY DEPT VISIT LOW MDM: CPT

## 2023-06-06 PROCEDURE — 73100 X-RAY EXAM OF WRIST: CPT

## 2023-06-06 RX ORDER — ACETAMINOPHEN 500 MG
1000 TABLET ORAL ONCE
Status: COMPLETED | OUTPATIENT
Start: 2023-06-06 | End: 2023-06-06

## 2023-06-06 RX ADMIN — ACETAMINOPHEN 1000 MG: 500 TABLET, FILM COATED ORAL at 17:22

## 2023-06-06 NOTE — ED PROVIDER NOTES
Subjective   History of Present Illness      Patient 72-year-old female comes in complaining of bilateral wrist pain that occurred about 2 hours prior to arrival.  Patient states that she had been taking a hole in her yard for a pond when she fell forward onto her right wrist and left wrist.  Patient states her right wrist hurts about a 4 out of 10 at rest and worse with movement of the right wrist.  Patient reports mild pain in the left wrist when she flexes and extends at the wrist but otherwise has no pain at rest.  Patient denies any significant numbness tingling in the fingers bilaterally however patient did report some tingling in her right pinky that will come and go.  Patient denies any rashes or open wounds or any other injury.  Patient does report following up with [facility] in the past for history of of carpal tunnel repair.      Review of Systems   Constitutional:  Negative for chills, fatigue and fever.   HENT:  Negative for congestion, sore throat, tinnitus and trouble swallowing.    Eyes:  Negative for photophobia, discharge and visual disturbance.   Respiratory:  Negative for cough and shortness of breath.    Cardiovascular:  Negative for chest pain and leg swelling.   Gastrointestinal:  Negative for abdominal pain, diarrhea, nausea and vomiting.   Genitourinary:  Negative for dysuria, flank pain and urgency.   Musculoskeletal:  Negative for arthralgias and myalgias.        Bilateral wrist pain   Skin:  Negative for rash.   Neurological:  Negative for dizziness and headaches.   Psychiatric/Behavioral:  Negative for confusion.      Past Medical History:   Diagnosis Date    Allergic     Arthritis     Cataract     Depression     Headache Spring 2021    HL (hearing loss) 2019    Hyperlipidemia     Hypertension     Hyperthyroidism     Hypothyroidism     Osteopenia        Allergies   Allergen Reactions    Other Unknown (See Comments)     Wasps      Cefazolin Itching       Past Surgical History:    Procedure Laterality Date    ADENOIDECTOMY  1953    COLONOSCOPY  02/2018    COSMETIC SURGERY  1965    ENDOMETRIAL ABLATION  1982    FRACTURE SURGERY  1965    JOINT REPLACEMENT Left 06/03/2020    knee    TONSILLECTOMY  1953    TUBAL ABDOMINAL LIGATION  1982       Family History   Problem Relation Age of Onset    Hypertension Other        Social History     Socioeconomic History    Marital status:    Tobacco Use    Smoking status: Former     Packs/day: 1.50     Years: 15.00     Pack years: 22.50     Types: Cigarettes    Smokeless tobacco: Never    Tobacco comments:     quit 25 yrs ago   Vaping Use    Vaping Use: Never used   Substance and Sexual Activity    Alcohol use: Not Currently     Alcohol/week: 14.0 standard drinks     Types: 4 Glasses of wine, 10 Shots of liquor per week     Comment: Currently abstinent to try to lower liver numbers    Drug use: Never    Sexual activity: Yes     Partners: Female           Objective   Physical Exam  Vitals and nursing note reviewed.   Constitutional:       General: She is not in acute distress.     Appearance: Normal appearance. She is well-developed. She is not diaphoretic.   HENT:      Head: Normocephalic and atraumatic.      Right Ear: External ear normal.      Left Ear: External ear normal.      Nose: Nose normal.      Mouth/Throat:      Mouth: Mucous membranes are moist.   Eyes:      Extraocular Movements: Extraocular movements intact.      Conjunctiva/sclera: Conjunctivae normal.      Pupils: Pupils are equal, round, and reactive to light.   Cardiovascular:      Rate and Rhythm: Normal rate and regular rhythm.      Pulses: Normal pulses.      Heart sounds: Normal heart sounds.      Comments: S1, S2 audible.  Pulmonary:      Effort: Pulmonary effort is normal. No respiratory distress.      Breath sounds: Normal breath sounds. No wheezing, rhonchi or rales.      Comments: On room air.  Abdominal:      General: Bowel sounds are normal. There is no distension.       "Palpations: Abdomen is soft.      Tenderness: There is no abdominal tenderness. There is no guarding or rebound.   Musculoskeletal:         General: No tenderness or deformity. Normal range of motion.      Cervical back: Normal range of motion.      Right lower leg: No edema.      Left lower leg: No edema.   Skin:     General: Skin is warm.      Capillary Refill: Capillary refill takes less than 2 seconds.      Findings: No erythema or rash.   Neurological:      Mental Status: She is alert and oriented to person, place, and time.      Cranial Nerves: No cranial nerve deficit.   Psychiatric:         Mood and Affect: Mood normal.         Behavior: Behavior normal.       Procedures           ED Course  ED Course as of 06/07/23 0057   Tue Jun 06, 2023   1800 X-ray bilateral independently interpreted by myself shows no obvious fracture dislocation of wrist bilaterally, osteopenia present.  Official radiology read shows no acute fractures of either wrist.  Probable remote posttraumatic changes right wrist.  Osteoarthritis right greater than left wrist.  Widened scapholunate distance on the right likely due to scapholunate ligament disruption. [RL]      ED Course User Index  [RL] Bryce Monroy PA      /89 (BP Location: Left arm, Patient Position: Sitting)   Pulse 82   Temp 98.6 °F (37 °C) (Oral)   Resp 16   Ht 170.2 cm (67\")   Wt 83.8 kg (184 lb 11.9 oz)   SpO2 96%   BMI 28.94 kg/m²   Labs Reviewed - No data to display  XR Wrist 2 View Bilateral    Result Date: 6/6/2023  Impression: No acute fractures of either wrist. Probable remote posttraumatic changes right wrist. Osteoarthritis right greater than left wrist. Widened scapholunate distance on the right likely due to scapholunate ligament disruption. Electronically Signed: Blanca Arias  6/6/2023 5:58 PM EDT  Workstation ID: [id number]                                        Medical Decision Making  Problems Addressed:  Bilateral wrist pain: complicated acute " "illness or injury  Strain of left wrist, initial encounter: complicated acute illness or injury  Strain of right wrist, initial encounter: complicated acute illness or injury    Amount and/or Complexity of Data Reviewed  Radiology: ordered.    Risk  OTC drugs.        Differential diagnosis: Bilateral wrist strain, wrist fracture, not meant to be an all-inclusive list.    Chart review: Last office visit in 2021 with Dr. Perdomo for hiatal hernia follow-up.    EKG:  not indicated    Imaging: X-ray bilateral independently interpreted by myself shows no obvious fracture dislocation of wrist bilaterally, osteopenia present.  Official radiology read shows no acute fractures of either wrist.  Probable remote posttraumatic changes right wrist.  Osteoarthritis right greater than left wrist.  Widened scapholunate distance on the right likely due to scapholunate ligament disruption.  XR Wrist 2 View Bilateral   Final Result   Impression:   No acute fractures of either wrist. Probable remote posttraumatic changes right wrist. Osteoarthritis right greater than left wrist. Widened scapholunate distance on the right likely due to scapholunate ligament disruption.         Electronically Signed: Blanca Arias     6/6/2023 5:58 PM EDT     Workstation ID: [id number]          Labs:  not indicated    Vitals:  /89 (BP Location: Left arm, Patient Position: Sitting)   Pulse 82   Temp 98.6 °F (37 °C) (Oral)   Resp 16   Ht 170.2 cm (67\")   Wt 83.8 kg (184 lb 11.9 oz)   SpO2 96%   BMI 28.94 kg/m²     Medications given:    Medications   acetaminophen (TYLENOL) tablet 1,000 mg (1,000 mg Oral Given 6/6/23 1722)       Procedures:  not indicated    MDM: Patient is 72-year-old female comes in complaining of bilateral wrist injury, right worse than left.  Patient has great range of motion on exam with little pain bilaterally.  Patient has mild tenderness in the distal radius of the right wrist and base of right thumb pain exam.  X-ray shows no " acute fractures.  Will place patient in right wrist splint with thumb spica and instructed follow-up with hand surgery,[facility], for follow-up and in 3 days for reevaluation.  Patient is neurovascular intact distal injury bilateral upper extremities.  Patient is given Tylenol for pain control. See full discharge instructions for further details.  Results and plan discussed with patient and is agreeable with plan.        Final diagnoses:   Bilateral wrist pain   Strain of right wrist, initial encounter   Strain of left wrist, initial encounter       ED Disposition  ED Disposition       ED Disposition   Discharge    Condition   Stable    Comment   --               [facility] EMERGENCY DEPARTMENT  [address] [address] [phone]  Go in 1 day  As needed, If symptoms worsen    [facility] [place]  [address] [place] [address] [address]  [facility] [address] [phone]  Schedule an appointment as soon as possible for a visit in 3 days           Medication List      No changes were made to your prescriptions during this visit.            Bryce Monroy PA  06/07/23 0057

## 2023-06-06 NOTE — DISCHARGE INSTRUCTIONS
Please take Tylenol as needed for pain control.  Please follow-up with hand surgery, [facility], in 3 days for follow-up.  Please wear wrist brace for comfort until follow-up with hand surgery.  Please come back to the ER if you have significantly worsening pain and symptoms.

## 2023-06-06 NOTE — Clinical Note
[facility] EMERGENCY DEPARTMENT  [address] [facility] [place] [address]  Phone: [phone]    Africa Licona was seen and treated in our emergency department on 6/6/2023.  She may return to work on 06/07/2023.         Thank you for choosing [facility].    Bryce Monroy PA

## 2023-07-10 ENCOUNTER — OFFICE VISIT (OUTPATIENT)
Dept: FAMILY MEDICINE CLINIC | Facility: CLINIC | Age: 73
End: 2023-07-10
Payer: MEDICARE

## 2023-07-10 VITALS
DIASTOLIC BLOOD PRESSURE: 80 MMHG | OXYGEN SATURATION: 94 % | SYSTOLIC BLOOD PRESSURE: 118 MMHG | TEMPERATURE: 96.5 F | BODY MASS INDEX: 28.97 KG/M2 | HEIGHT: 67 IN | WEIGHT: 184.6 LBS | HEART RATE: 80 BPM

## 2023-07-10 DIAGNOSIS — Z00.00 MEDICARE ANNUAL WELLNESS VISIT, SUBSEQUENT: Primary | ICD-10-CM

## 2023-07-10 DIAGNOSIS — E03.9 HYPOTHYROIDISM, UNSPECIFIED TYPE: ICD-10-CM

## 2023-07-10 DIAGNOSIS — E78.5 HYPERLIPIDEMIA, UNSPECIFIED HYPERLIPIDEMIA TYPE: ICD-10-CM

## 2023-07-10 DIAGNOSIS — I10 HYPERTENSION, UNSPECIFIED TYPE: ICD-10-CM

## 2023-07-10 PROCEDURE — 3074F SYST BP LT 130 MM HG: CPT | Performed by: FAMILY MEDICINE

## 2023-07-10 PROCEDURE — 1160F RVW MEDS BY RX/DR IN RCRD: CPT | Performed by: FAMILY MEDICINE

## 2023-07-10 PROCEDURE — 3079F DIAST BP 80-89 MM HG: CPT | Performed by: FAMILY MEDICINE

## 2023-07-10 PROCEDURE — 1159F MED LIST DOCD IN RCRD: CPT | Performed by: FAMILY MEDICINE

## 2023-07-10 PROCEDURE — G0439 PPPS, SUBSEQ VISIT: HCPCS | Performed by: FAMILY MEDICINE

## 2023-07-10 NOTE — PATIENT INSTRUCTIONS
Medicare Wellness  Personal Prevention Plan of Service     Date of Office Visit:    Encounter Provider:  Luz Perdomo MD  Place of Service:  [facility]  Patient Name: Africa Licona  :  1950    As part of the Medicare Wellness portion of your visit today, we are providing you with this personalized preventive plan of services (PPPS). This plan is based upon recommendations of the [place] Preventive Services Task Force (USPSTF) and the Advisory Committee on Immunization Practices (ACIP).    This lists the preventive care services that should be considered, and provides dates of when you are due. Items listed as completed are up-to-date and do not require any further intervention.    Health Maintenance   Topic Date Due    TDAP/TD VACCINES (1 - Tdap) Never done    HEPATITIS C SCREENING  Never done    LIPID PANEL  2022    DXA SCAN  2022    COVID-19 Vaccine (5 - Pfizer series) 2023 (Originally 2023)    INFLUENZA VACCINE  10/01/2023    ANNUAL WELLNESS VISIT  07/10/2024    MAMMOGRAM  2024    COLORECTAL CANCER SCREENING  2031    Pneumococcal Vaccine 65+  Completed    ZOSTER VACCINE  Completed       Orders Placed This Encounter   Procedures    TSH     Order Specific Question:   Release to patient     Answer:   Routine Release    Comprehensive Metabolic Panel     Order Specific Question:   Release to patient     Answer:   Routine Release    Lipid Panel       Return in about 1 year (around 7/10/2024) for Medicare Wellness.        Please check with your insurance and get the adacel Tdap vaccine at your pharmacy to help protect you from getting tetanus or whooping cough.

## 2023-07-10 NOTE — PROGRESS NOTES
The ABCs of the Annual Wellness Visit  Subsequent Medicare Wellness Visit    Subjective      Africa Licona is a 72 y.o. female who presents for a Subsequent Medicare Wellness Visit. She is scheduled in August for right knee replacement.     The following portions of the patient's history were reviewed and   updated as appropriate: allergies, current medications, past family history, past medical history, past social history, past surgical history, and problem list.    Compared to one year ago, the patient feels her physical   health is the same.    Compared to one year ago, the patient feels her mental   health is the same.    Recent Hospitalizations:  She was not admitted to the hospital during the last year.       Current Medical Providers:  Patient Care Team:  Luz Perdomo MD as PCP - General  KinseyKeshawn MD as Consulting Physician (Orthopedic Surgery)    Outpatient Medications Prior to Visit   Medication Sig Dispense Refill    Cholecalciferol (VITAMIN D3) 2000 units tablet Daily.      Cyanocobalamin (B-12) 1000 MCG tablet Daily.      lisinopril (PRINIVIL,ZESTRIL) 10 MG tablet TAKE 1 TABLET BY MOUTH DAILY 90 tablet 0    meclizine (ANTIVERT) 25 MG tablet Take 1 tablet by mouth 3 (Three) Times a Day As Needed for Dizziness. 30 tablet 0    meloxicam (MOBIC) 15 MG tablet TAKE 1 TABLET BY MOUTH DAILY 90 tablet 0    omeprazole (priLOSEC) 20 MG capsule TAKE 1 CAPSULE BY MOUTH DAILY 90 capsule 1    FLUoxetine (PROzac) 20 MG capsule TAKE 1 CAPSULE BY MOUTH EVERY DAY 90 capsule 0    levothyroxine (SYNTHROID, LEVOTHROID) 175 MCG tablet TAKE 1 TABLET BY MOUTH DAILY 90 tablet 0    rosuvastatin (CRESTOR) 20 MG tablet TAKE 1 TABLET BY MOUTH EVERY NIGHT AT BEDTIME 90 tablet 0     No facility-administered medications prior to visit.       No opioid medication identified on active medication list. I have reviewed chart for other potential  high risk medication/s and harmful drug interactions in the  "elderly.        Aspirin is not on active medication list.  Aspirin use is not indicated based on review of current medical condition/s. Risk of harm outweighs potential benefits.  .    Patient Active Problem List   Diagnosis    Hyperlipidemia    Hypertension    Hypothyroidism    Screening for breast cancer    Postmenopausal    Other screening mammogram    Depression    Esophageal stricture    Allergic contact dermatitis due to plants, except food    Encounter for subsequent annual wellness visit (AWV) in Medicare patient    Cluster headache, not intractable    Primary osteoarthritis of both knees    Hiatal hernia     Advance Care Planning   Advance Care Planning     Advance Directive is not on file.  ACP discussion was held with the patient during this visit. Patient does not have an advance directive, information provided.     Objective    Vitals:    07/10/23 1426   BP: 118/80   BP Location: Left arm   Patient Position: Sitting   Cuff Size: Adult   Pulse: 80   Temp: 96.5 °F (35.8 °C)   TempSrc: Infrared   SpO2: 94%   Weight: 83.7 kg (184 lb 9.6 oz)   Height: 170.2 cm (67\")     Estimated body mass index is 28.91 kg/m² as calculated from the following:    Height as of this encounter: 170.2 cm (67\").    Weight as of this encounter: 83.7 kg (184 lb 9.6 oz).    BMI is >= 25 and <30. (Overweight) The following options were offered after discussion;: exercise counseling/recommendations  Physical Exam  Vitals and nursing note reviewed.   Constitutional:       General: She is not in acute distress.     Appearance: She is well-developed.   HENT:      Head: Normocephalic.   Eyes:      General: Lids are normal.      Conjunctiva/sclera: Conjunctivae normal.   Neck:      Thyroid: No thyroid mass or thyromegaly.      Trachea: Trachea normal.   Cardiovascular:      Rate and Rhythm: Normal rate and regular rhythm.      Heart sounds: Normal heart sounds.   Pulmonary:      Effort: Pulmonary effort is normal.      Breath sounds: " Normal breath sounds.   Abdominal:      Palpations: Abdomen is soft.   Musculoskeletal:      Cervical back: Normal range of motion.   Lymphadenopathy:      Cervical: No cervical adenopathy.   Skin:     General: Skin is warm and dry.   Neurological:      Mental Status: She is alert and oriented to person, place, and time.   Psychiatric:         Attention and Perception: She is attentive.         Mood and Affect: Mood normal.         Speech: Speech normal.         Behavior: Behavior normal.         Does the patient have evidence of cognitive impairment?   No    Lab Results   Component Value Date    TRIG 99 2023    HDL 90 (H) 2023     (H) 2023    VLDL 17 2023          HEALTH RISK ASSESSMENT    Smoking Status:  Social History     Tobacco Use   Smoking Status Former    Packs/day: 1.50    Years: 15.00    Pack years: 22.50    Types: Cigarettes   Smokeless Tobacco Never   Tobacco Comments    quit 25 yrs ago     Alcohol Consumption:  Social History     Substance and Sexual Activity   Alcohol Use Not Currently    Alcohol/week: 14.0 standard drinks    Types: 4 Glasses of wine, 10 Shots of liquor per week    Comment: Currently abstinent to try to lower liver numbers     Fall Risk Screen:    [facility] not [facility].    Depression Screenin/10/2023     2:40 PM   PHQ-2/PHQ-9 Depression Screening   Little Interest or Pleasure in Doing Things 0-->not at all   Feeling Down, Depressed or Hopeless 0-->not at all   PHQ-9: Brief Depression Severity Measure Score 0       Health Habits and Functional and Cognitive Screenin/10/2023     2:00 PM   Functional & Cognitive Status   Do you have difficulty preparing food and eating? No   Do you have difficulty bathing yourself, getting dressed or grooming yourself? No   Do you have difficulty using the toilet? No   Do you have difficulty moving around from place to place? No   Do you have trouble with steps or getting out  of a bed or a chair? No   Current Diet Well Balanced Diet   Dental Exam Up to date   Eye Exam Up to date   Exercise (times per week) 3 times per week   Current Exercises Include Swimming   Do you need help using the phone?  No   Are you deaf or do you have serious difficulty hearing?  Yes   Do you need help to go to places out of walking distance? No   Do you need help shopping? No   Do you need help preparing meals?  No   Do you need help with housework?  No   Do you need help with laundry? No   Do you need help taking your medications? No   Do you need help managing money? No   Do you ever drive or ride in a car without wearing a seat belt? No   Have you felt unusual stress, anger or loneliness in the last month? No   Who do you live with? Spouse   If you need help, do you have trouble finding someone available to you? No   Do you have difficulty concentrating, remembering or making decisions? No       Age-appropriate Screening Schedule:  Refer to the list below for future screening recommendations based on patient's age, sex and/or medical conditions. Orders for these recommended tests are listed in the plan section. The patient has been provided with a written plan.    Health Maintenance   Topic Date Due    TDAP/TD VACCINES (1 - Tdap) Never done    HEPATITIS C SCREENING  Never done    DXA SCAN  11/06/2022    COVID-19 Vaccine (5 - Pfizer series) 08/19/2023 (Originally 1/14/2023)    INFLUENZA VACCINE  10/01/2023    ANNUAL WELLNESS VISIT  07/10/2024    LIPID PANEL  07/13/2024    MAMMOGRAM  12/27/2024    COLORECTAL CANCER SCREENING  12/03/2031    Pneumococcal Vaccine 65+  Completed    ZOSTER VACCINE  Completed                  CMS Preventative Services Quick Reference  Risk Factors Identified During Encounter:    Inactivity/Sedentary: Patient was advised to exercise at least 150 minutes a week per CDC recommendations.  Dental Screening Recommended  Vision Screening Recommended    The above risks/problems have been  discussed with the patient.  Pertinent information has been shared with the patient in the After Visit Summary.    Diagnoses and all orders for this visit:    1. Medicare annual wellness visit, subsequent (Primary)    2. Hyperlipidemia, unspecified hyperlipidemia type  -     TSH  -     Comprehensive Metabolic Panel  -     Lipid Panel    3. Hypertension, unspecified type  -     TSH  -     Comprehensive Metabolic Panel  -     Lipid Panel    4. Hypothyroidism, unspecified type  -     TSH  -     Comprehensive Metabolic Panel  -     Lipid Panel        Follow Up:   Next Medicare Wellness visit to be scheduled in 1 year.      An After Visit Summary and PPPS were made available to the patient.

## 2023-07-13 LAB
ALBUMIN SERPL-MCNC: 4.6 G/DL (ref 3.5–5.2)
ALBUMIN/GLOB SERPL: 1.5 G/DL
ALP SERPL-CCNC: 57 U/L (ref 39–117)
ALT SERPL W P-5'-P-CCNC: 39 U/L (ref 1–33)
ANION GAP SERPL CALCULATED.3IONS-SCNC: 12.7 MMOL/L (ref 5–15)
AST SERPL-CCNC: 30 U/L (ref 1–32)
BILIRUB SERPL-MCNC: 0.4 MG/DL (ref 0–1.2)
BUN SERPL-MCNC: 18 MG/DL (ref 8–23)
BUN/CREAT SERPL: 21.7 (ref 7–25)
CALCIUM SPEC-SCNC: 10 MG/DL (ref 8.6–10.5)
CHLORIDE SERPL-SCNC: 104 MMOL/L (ref 98–107)
CHOLEST SERPL-MCNC: 229 MG/DL (ref 0–200)
CO2 SERPL-SCNC: 23.3 MMOL/L (ref 22–29)
CREAT SERPL-MCNC: 0.83 MG/DL (ref 0.57–1)
EGFRCR SERPLBLD CKD-EPI 2021: 74.1 ML/MIN/1.73
GLOBULIN UR ELPH-MCNC: 3 GM/DL
GLUCOSE SERPL-MCNC: 99 MG/DL (ref 65–99)
HDLC SERPL-MCNC: 90 MG/DL (ref 40–60)
LDLC SERPL CALC-MCNC: 122 MG/DL (ref 0–100)
LDLC/HDLC SERPL: 1.32 {RATIO}
POTASSIUM SERPL-SCNC: 4.1 MMOL/L (ref 3.5–5.2)
PROT SERPL-MCNC: 7.6 G/DL (ref 6–8.5)
SODIUM SERPL-SCNC: 140 MMOL/L (ref 136–145)
TRIGL SERPL-MCNC: 99 MG/DL (ref 0–150)
TSH SERPL DL<=0.05 MIU/L-ACNC: 0.21 UIU/ML (ref 0.27–4.2)
VLDLC SERPL-MCNC: 17 MG/DL (ref 5–40)

## 2023-07-13 PROCEDURE — 36415 COLL VENOUS BLD VENIPUNCTURE: CPT | Performed by: FAMILY MEDICINE

## 2023-07-28 ENCOUNTER — TREATMENT (OUTPATIENT)
Dept: PHYSICAL THERAPY | Facility: CLINIC | Age: 73
End: 2023-07-28
Payer: MEDICARE

## 2023-07-28 ENCOUNTER — TRANSCRIBE ORDERS (OUTPATIENT)
Dept: PHYSICAL THERAPY | Facility: CLINIC | Age: 73
End: 2023-07-28
Payer: MEDICARE

## 2023-07-28 DIAGNOSIS — M17.11 ARTHRITIS OF RIGHT KNEE: ICD-10-CM

## 2023-07-28 DIAGNOSIS — G89.29 CHRONIC PAIN OF RIGHT KNEE: Primary | ICD-10-CM

## 2023-07-28 DIAGNOSIS — M25.561 CHRONIC PAIN OF RIGHT KNEE: Primary | ICD-10-CM

## 2023-07-28 DIAGNOSIS — M17.11 ARTHRITIS OF KNEE, RIGHT: Primary | ICD-10-CM

## 2023-07-28 PROCEDURE — 97535 SELF CARE MNGMENT TRAINING: CPT | Performed by: PHYSICAL THERAPIST

## 2023-07-28 PROCEDURE — 97110 THERAPEUTIC EXERCISES: CPT | Performed by: PHYSICAL THERAPIST

## 2023-07-28 PROCEDURE — 97161 PT EVAL LOW COMPLEX 20 MIN: CPT | Performed by: PHYSICAL THERAPIST

## 2023-07-28 NOTE — PROGRESS NOTES
Physical Therapy Initial Evaluation and Plan of Care    Patient: Africa Licona   : 1950  Diagnosis/ICD-10 Code:  Chronic pain of right knee [M25.561, G89.29]  Referring practitioner: Zurdo Keys MD  Date of Initial Visit: 2023  Today's Date: 2023  Patient seen for 1 sessions           Subjective Questionnaire: LEFS: 52%      Subjective Evaluation    History of Present Illness  Mechanism of injury: Pt is referred to therapy for pre-hab for R TKR. She is scheduled for TKR on 23.  She had L TKR in .    Onset of symptoms : 2-3 years    Aggravating factors: standing, walking , stairs, having it flat.     Relieving factors: rest     Functional limitation: taking walks, yard work, house work, squatting, going up/down stairs    PMH: see Epic          Patient Occupation: retired Quality of life: good    Pain  Current pain ratin  At best pain ratin  At worst pain ratin    Social Support  Lives in: one-story house  Lives with: spouse    Patient Goals  Patient goals for therapy: decreased pain, increased motion, increased strength and return to sport/leisure activities         Precautions:     Objective          Observations     Additional Knee Observation Details  R genu valgus    Active Range of Motion   Left Knee   Flexion: 132 degrees   Extension: 0 degrees     Right Knee   Flexion: 120 degrees with pain  Extension: 10 degrees with pain    Additional Active Range of Motion Details  In supine    Strength/Myotome Testing     Left Knee   Normal strength  Quadriceps contraction: good    Right Knee   Normal strength  Quadriceps contraction: fair    Functional Assessment     Single Leg Stance   Left: 5 seconds  Right: 5 seconds    Comments  With UE support    Has difficulty with heel raises        Assessment & Plan     Assessment  Impairments: abnormal muscle firing, abnormal or restricted ROM, activity intolerance, lacks appropriate home exercise program, pain with function and  weight-bearing intolerance  Functional Limitations: walking, uncomfortable because of pain, standing and stooping  Assessment details: Pt is a 72 y.o. female referred to therapy for pre-hab for R TKR .  She is going to have surgery on 8/16/23. Will have 2 weeks of  and then coming to O.P.    Pt presents with impaired ROM and decrease tolerance to standing, walking, going up/down stairs and functional activities such as yard work and house work.      Pt is a good candidate for rehab and will benefit from skilled physical therapy to address impairments, resolve pain and maximize function.      She will return to therapy after her surgery.   Prognosis: good    Goals  Plan Goals: STGs:     1- Pt to demonstrated understanding of her HEP - MET    Plan  Plan details: Pt was instructed in pre- surgery HEP. Will be seen after her TKR and DC from .       See flow sheet for treatment detail    History # of Personal Factors and/or Comorbidities: LOW (0)  Examination of Body System(s): # of elements: LOW (1-2)  Clinical Presentation: STABLE   Clinical Decision Making: LOW           Timed:         Manual Therapy:         mins  00526;     Therapeutic Exercise:   15      mins  77518;     Neuromuscular Demetrius:        mins  13006;    Therapeutic Activity:          mins  49463;     Gait Training:           mins  74828;     Ultrasound:          mins  72578;    Ionto                                   mins   54416  Self Care                      10      mins   00694        Un-Timed:  Electrical Stimulation:         mins  33676 ( );  Dry Needling          mins self-pay  Traction          mins 54428  Canal repositioning           mins    77336  Low Eval    20     Mins  87895  Mod Eval          Mins  75555  High Eval                            Mins  45220  Re-Eval                               mins  21285        Timed Treatment:   25   mins   Total Treatment:     45   mins    PT SIGNATURE: Alex Al PT, CLT  [place] License: #  [id number]     DATE TREATMENT INITIATED: 7/28/2023    Initial Certification  Certification Period: 7/28/2023 thru 10/25/2023  I certify that the therapy services are furnished while this patient is under my care.  The services outlined above are required by this patient, and will be reviewed every 90 days.     PHYSICIAN: Zurdo Keys MD   NPI: [id number]                                      DATE:     Please sign and return via fax to [phone].. Thank you, [facility].

## 2023-08-18 RX ORDER — OMEPRAZOLE 20 MG/1
CAPSULE, DELAYED RELEASE ORAL
Qty: 90 CAPSULE | Refills: 1 | Status: SHIPPED | OUTPATIENT
Start: 2023-08-18

## 2023-08-22 RX ORDER — LISINOPRIL 10 MG/1
10 TABLET ORAL DAILY
Qty: 90 TABLET | Refills: 1 | Status: SHIPPED | OUTPATIENT
Start: 2023-08-22

## 2023-09-06 ENCOUNTER — TREATMENT (OUTPATIENT)
Dept: PHYSICAL THERAPY | Facility: CLINIC | Age: 73
End: 2023-09-06
Payer: MEDICARE

## 2023-09-06 DIAGNOSIS — M17.11 PRIMARY OSTEOARTHRITIS OF RIGHT KNEE: Primary | ICD-10-CM

## 2023-09-06 DIAGNOSIS — Z96.651 TOTAL KNEE REPLACEMENT STATUS, RIGHT: ICD-10-CM

## 2023-09-06 DIAGNOSIS — M25.561 CHRONIC PAIN OF RIGHT KNEE: ICD-10-CM

## 2023-09-06 DIAGNOSIS — G89.29 CHRONIC PAIN OF RIGHT KNEE: ICD-10-CM

## 2023-09-06 PROCEDURE — 97161 PT EVAL LOW COMPLEX 20 MIN: CPT | Performed by: PHYSICAL THERAPIST

## 2023-09-06 PROCEDURE — 97110 THERAPEUTIC EXERCISES: CPT | Performed by: PHYSICAL THERAPIST

## 2023-09-06 PROCEDURE — 97530 THERAPEUTIC ACTIVITIES: CPT | Performed by: PHYSICAL THERAPIST

## 2023-09-06 NOTE — PROGRESS NOTES
Physical Therapy Initial Evaluation and Plan of Care    Patient: Africa Licona   : 1950  Diagnosis/ICD-10 Code:  Primary osteoarthritis of right knee [M17.11]  Referring practitioner: Zurdo Keys MD  Date of Initial Visit: 2023  Today's Date: 2023  Patient seen for 1 sessions           Subjective Questionnaire: LEFS: 69%      Subjective Evaluation    History of Present Illness  Date of surgery: 2023  Mechanism of injury: Pt is referred to therapy s/p R TKR on 23.  Reports she went home the same day.  She had [facility] the next day 3x week up to last Friday.   She used a wx for just a few days and went to a cane. Now She only uses her cane if she is out in the yard or has to walk a long distance mostly for security. Reports she still has a hard time getting comfortable at night.     She has been doing her HEP. She has a restorator.  Has been walking a short distance daily.     Still taking her pain medication but as needed.     Aggravating factors: bending the knee, prolonged standing, lying in bed, sleeping in bed.  She still sleeps in the recliner.     Relieving factors: change position, ice and pain med    Functional limitation: driving, yard work, Gardening, taking long walks, squatting, prolonged sitting.     PMH: L TKR, arthritis, depression           Patient Occupation: retired Quality of life: good    Pain  Current pain ratin  At best pain rating: 3  At worst pain ratin  Location: R knee  Quality: tight and dull ache  Progression: improved    Social Support  Lives with: significant other    Patient Goals  Patient goals for therapy: decreased pain, improved balance, increased motion, increased strength and return to sport/leisure activities         Precautions: post surgical precautions    Objective          Observations     Right Knee   Positive for edema and incision.     Additional Knee Observation Details  healing incision scar, no drainage , open areas or redness.      Ambulating without a.d. with mild gait deviation. Reports she left it in the car.     Palpation     Additional Palpation Details  Defused TTP med/ lat / posterior aspects     Active Range of Motion     Right Knee   Flexion: 110 degrees   Extension: 5 degrees     Additional Active Range of Motion Details  In supine    Patellar Mobility     Right Knee Hypomobile in the medial, lateral, superior and inferior patellar tendon(s).     Strength/Myotome Testing     Left Knee   Normal strength    Right Knee   Flexion: 4  Extension: 4  Quadriceps contraction: good    Additional Strength Details  Able to perform x10 SLR    Swelling     Left Knee Girth Measurement (cm)   Joint line: 39 cm    Right Knee Girth Measurement (cm)   Joint line: 36 cm    Functional Assessment     Single Leg Stance   Left: 10 seconds  Right: 10 seconds    Comments  With B UE support        Assessment & Plan       Assessment  Impairments: abnormal gait, abnormal or restricted ROM, activity intolerance, pain with function and weight-bearing intolerance   Functional limitations: sleeping, walking, uncomfortable because of pain, standing and stooping   Assessment details: Pt is a 72 y.o. female referred to therapy s/p R TKR on 8/16/23. She received 2 weeks of [facility].  Pt presents with restricted ROM/ strength in R knee, decrease tolerance to standing/ walking and wb activities.   Upon initial evaluation pt exhibits the above impairments and functional limitations. Impairments affect ability to drive, yard work, gardening and walking on uneven surfaces  without a.d.   Pt is a good candidate for rehab and will benefit from skilled physical therapy to address impairments, resolve pain and maximize function.    Prognosis: good    Goals  Plan Goals: STGs: in 4 weeks    1- Pt will tolerate  initial HEP and progression of her exercise program  2- Pt will be I with initial HEP  3- Pt will report at least 25 % improvement and pain reduction  4- R knee flex will be  115 or better  5- R knee ext will be 0    LTGs: by DC     1- Pt will report at least 85 % improvement and pain reduction  2- Pt will be I with final HEP and self management of her condition  3- Pt will have improved LEFS score indicating improved function, pain and symptom reduction  4- Pt will voice readiness for DC with I program   5- R knee strength will be 5/5  6- Pt will be able to negotiate stairs without inc pain    7- Pt will ambulate with normal gait pattern    Plan  Therapy options: will be seen for skilled therapy services  Planned modality interventions: high voltage pulsed current (pain management) and cryotherapy  Planned therapy interventions: functional ROM exercises, gait training, home exercise program, manual therapy, neuromuscular re-education, strengthening, therapeutic activities, balance/weight-bearing training and postural training  Frequency: 2x week  Duration in weeks: 12  Treatment plan discussed with: patient      See flow sheet for treatment detail    History # of Personal Factors and/or Comorbidities: LOW (0)  Examination of Body System(s): # of elements: LOW (1-2)  Clinical Presentation: STABLE   Clinical Decision Making: LOW           Timed:         Manual Therapy:         mins  69237;     Therapeutic Exercise:   15      mins  42123;     Neuromuscular Demetrius:        mins  08123;    Therapeutic Activity:    10      mins  88705;     Gait Training:           mins  46850;     Ultrasound:          mins  61276;    Ionto                                   mins   52540  Self Care                            mins   52836        Un-Timed:  Electrical Stimulation:         mins  50215 ( );  Dry Needling          mins self-pay  Traction          mins 38897  Canal repositioning           mins    63545  Low Eval    25      Mins  36475  Mod Eval          Mins  28404  High Eval                            Mins  23668  Re-Eval                               mins  97188        Timed Treatment:  25     mins   Total Treatment:     50   mins    PT SIGNATURE: Alex Al PT, CLT  [place] License: # [id number]     DATE TREATMENT INITIATED: 9/6/2023    Initial Certification  Certification Period: 9/6/2023 thru 12/4/2023  I certify that the therapy services are furnished while this patient is under my care.  The services outlined above are required by this patient, and will be reviewed every 90 days.     PHYSICIAN: Zurdo Keys MD   NPI: [id number]                                      DATE:     Please sign and return via fax to [phone].. Thank you, [facility].

## 2023-09-12 ENCOUNTER — TREATMENT (OUTPATIENT)
Dept: PHYSICAL THERAPY | Facility: CLINIC | Age: 73
End: 2023-09-12
Payer: MEDICARE

## 2023-09-12 DIAGNOSIS — Z96.651 TOTAL KNEE REPLACEMENT STATUS, RIGHT: ICD-10-CM

## 2023-09-12 DIAGNOSIS — M17.11 PRIMARY OSTEOARTHRITIS OF RIGHT KNEE: Primary | ICD-10-CM

## 2023-09-12 PROCEDURE — 97112 NEUROMUSCULAR REEDUCATION: CPT | Performed by: PHYSICAL THERAPIST

## 2023-09-12 PROCEDURE — 97530 THERAPEUTIC ACTIVITIES: CPT | Performed by: PHYSICAL THERAPIST

## 2023-09-12 PROCEDURE — 97110 THERAPEUTIC EXERCISES: CPT | Performed by: PHYSICAL THERAPIST

## 2023-09-12 NOTE — PROGRESS NOTES
Physical Therapy Daily Treatment Note    [facility], [address]  [place] [address]  [phone]    Patient: Africa Licona  : 1950  Referring practitioner: Zurdo Keys MD  Diagnosis/ ICD10 code: Primary osteoarthritis of right knee [M17.11]  Today's Date: 2023    VISIT#: 2  s/p R TKR on 23.     Subjective   Pt reports: doing ok this morning, has a hard time at night can't get comfortable.       Objective     See Exercise, Manual, and Modality Logs for complete treatment.     Patient Education:    Assessment & Plan       Assessment  Assessment details: Pt tolerated today's rx session well without any issues.         Progress per Plan of Care            Timed:         Manual Therapy:         mins  26457;     Therapeutic Exercise:    16    mins  23566;     Neuromuscular Demetrius:   10     mins  79669;    Therapeutic Activity:   12       mins  01818;     Gait Training:           mins  67540;     Ultrasound:          mins  13594;    Ionto                                   mins   02318  Self Care                            mins   24154      Un-Timed:  Electrical Stimulation:         mins  73286 ( );  Traction          mins 00939  Canal repositioning           mins    52178        Timed Treatment:  38    mins   Total Treatment:    38    mins    Alex Al, PT, CLT  Physical Therapist  [place] License:  # [id number]

## 2023-09-14 ENCOUNTER — TREATMENT (OUTPATIENT)
Dept: PHYSICAL THERAPY | Facility: CLINIC | Age: 73
End: 2023-09-14
Payer: MEDICARE

## 2023-09-14 DIAGNOSIS — M17.11 PRIMARY OSTEOARTHRITIS OF RIGHT KNEE: Primary | ICD-10-CM

## 2023-09-14 DIAGNOSIS — Z96.651 TOTAL KNEE REPLACEMENT STATUS, RIGHT: ICD-10-CM

## 2023-09-14 PROCEDURE — G0283 ELEC STIM OTHER THAN WOUND: HCPCS | Performed by: PHYSICAL THERAPIST

## 2023-09-14 PROCEDURE — 97530 THERAPEUTIC ACTIVITIES: CPT | Performed by: PHYSICAL THERAPIST

## 2023-09-14 PROCEDURE — 97110 THERAPEUTIC EXERCISES: CPT | Performed by: PHYSICAL THERAPIST

## 2023-09-14 NOTE — PROGRESS NOTES
Physical Therapy Daily Treatment Note    [facility], [address]  [place] [address]  [phone]    Patient: Africa Licona  : 1950  Referring practitioner: Zurdo Keys MD  Diagnosis/ ICD10 code: Primary osteoarthritis of right knee [M17.11]  Today's Date: 2023    VISIT#: 3  s/p R TKR on 23.      Subjective   Pt reports: her knee is pretty sore this morning. It is a different pain. Had some dental work done yesterday and is still having pain and just doesn't feel good. Rates her pain 6/10 this morning, forgot to take any pain pill.       Objective     See Exercise, Manual, and Modality Logs for complete treatment.     Patient Education:    Assessment & Plan       Assessment  Assessment details: Pt tolerated today's rx session well without any issues. Trial of ES today for pain management since she was having more pain this morning.              Progress per Plan of Care            Timed:         Manual Therapy:         mins  47493;     Therapeutic Exercise:   18      mins  80564;     Neuromuscular Demetrius:        mins  93791;    Therapeutic Activity:     12     mins  84636;     Gait Training:           mins  73407;     Ultrasound:          mins  23516;    Ionto                                   mins   20727  Self Care                            mins   95357      Un-Timed:  Electrical Stimulation:   20      mins  59625 ( );  Traction          mins 42592  Canal repositioning           mins    08792        Timed Treatment:  30    mins   Total Treatment:     50   mins    Alex Al PT, CLT  Physical Therapist  [place] License:  # [id number]

## 2023-09-20 ENCOUNTER — TREATMENT (OUTPATIENT)
Dept: PHYSICAL THERAPY | Facility: CLINIC | Age: 73
End: 2023-09-20
Payer: MEDICARE

## 2023-09-20 DIAGNOSIS — Z96.651 TOTAL KNEE REPLACEMENT STATUS, RIGHT: ICD-10-CM

## 2023-09-20 DIAGNOSIS — M17.11 PRIMARY OSTEOARTHRITIS OF RIGHT KNEE: Primary | ICD-10-CM

## 2023-09-20 PROCEDURE — 97530 THERAPEUTIC ACTIVITIES: CPT | Performed by: PHYSICAL THERAPIST

## 2023-09-20 PROCEDURE — 97110 THERAPEUTIC EXERCISES: CPT | Performed by: PHYSICAL THERAPIST

## 2023-09-20 NOTE — PROGRESS NOTES
Physical Therapy Daily Treatment Note    [address], [address]  [place] [address]  [phone]    Patient: Africa Licona  : 1950  Referring practitioner: Zurdo Keys MD  Diagnosis/ ICD10 code: Primary osteoarthritis of right knee [M17.11]  Today's Date: 2023    VISIT#: 4    Subjective   Pt reports: doing pretty well, twisted her knee the other day and it was really sore for a couple of days but better now.       Objective     See Exercise, Manual, and Modality Logs for complete treatment.     Patient Education:    Assessment & Plan       Assessment  Assessment details: Pt tolerated today's rx session well. Tolerating progression of her exercises program and making steady progress.         Progress per Plan of Care and Progress strengthening /stabilization /functional activity            Timed:         Manual Therapy:         mins  06809;     Therapeutic Exercise:  18       mins  93792;     Neuromuscular Demetrius:        mins  24263;    Therapeutic Activity:     12     mins  19270;     Gait Training:           mins  60126;     Ultrasound:          mins  07240;    Ionto                                   mins   49331  Self Care                            mins   00273      Un-Timed:  Electrical Stimulation:         mins  74450 ( );  Traction          mins 05418  Canal repositioning           mins    84325        Timed Treatment:  30    mins   Total Treatment:     30   mins    Alex Al PT, CLT  Physical Therapist  [place] License:  # [id number]

## 2023-09-22 ENCOUNTER — TREATMENT (OUTPATIENT)
Dept: PHYSICAL THERAPY | Facility: CLINIC | Age: 73
End: 2023-09-22
Payer: MEDICARE

## 2023-09-22 DIAGNOSIS — M17.11 PRIMARY OSTEOARTHRITIS OF RIGHT KNEE: Primary | ICD-10-CM

## 2023-09-22 DIAGNOSIS — Z96.651 TOTAL KNEE REPLACEMENT STATUS, RIGHT: ICD-10-CM

## 2023-09-22 PROCEDURE — 97112 NEUROMUSCULAR REEDUCATION: CPT | Performed by: PHYSICAL THERAPIST

## 2023-09-22 PROCEDURE — 97110 THERAPEUTIC EXERCISES: CPT | Performed by: PHYSICAL THERAPIST

## 2023-09-22 NOTE — PROGRESS NOTES
Physical Therapy Daily Treatment Note    [address] [facility], [address]  [place] [address]  [phone]    Patient: Africa Licona  : 1950  Referring practitioner: Zurdo Keys MD  Diagnosis/ ICD10 code: Primary osteoarthritis of right knee [M17.11]  Today's Date: 2023    VISIT#: 5    Subjective   Pt reports: doing pretty well today, did ok after last rx. She didn't ice it right after she got home so it got a little sore.       Objective          Active Range of Motion     Right Knee   Flexion: 115 degrees   Extension: 0 degrees     Additional Active Range of Motion Details  Measured in supine      See Exercise, Manual, and Modality Logs for complete treatment.     Patient Education:    Assessment & Plan       Assessment  Assessment details: Pt tolerated today's rx session well. Making steady progress with improved ROM, improved gait and functional mobility.         Progress per Plan of Care            Timed:         Manual Therapy:   5      mins  24241;     Therapeutic Exercise:   18      mins  09776;     Neuromuscular Demetrius:        mins  86437;    Therapeutic Activity:   12       mins  80542;     Gait Training:           mins  68355;     Ultrasound:          mins  70660;    Ionto                                   mins   62025  Self Care                            mins   93841      Un-Timed:  Electrical Stimulation:         mins  90093 ( );  Traction          mins 60567  Canal repositioning           mins    89874        Timed Treatment:  35   mins   Total Treatment:    35   mins    Alex Al PT, CLT  Physical Therapist  [place] License:  # [id number]

## 2023-09-26 ENCOUNTER — TREATMENT (OUTPATIENT)
Dept: PHYSICAL THERAPY | Facility: CLINIC | Age: 73
End: 2023-09-26
Payer: MEDICARE

## 2023-09-26 DIAGNOSIS — Z96.651 TOTAL KNEE REPLACEMENT STATUS, RIGHT: ICD-10-CM

## 2023-09-26 DIAGNOSIS — M17.11 PRIMARY OSTEOARTHRITIS OF RIGHT KNEE: Primary | ICD-10-CM

## 2023-09-26 PROCEDURE — 97110 THERAPEUTIC EXERCISES: CPT | Performed by: PHYSICAL THERAPIST

## 2023-09-26 PROCEDURE — 97530 THERAPEUTIC ACTIVITIES: CPT | Performed by: PHYSICAL THERAPIST

## 2023-09-26 NOTE — PROGRESS NOTES
Physical Therapy Daily Treatment Note    [facility] [facility], [address]  [place] [address] [phone]    Patient: Africa Licona  : 1950  Referring practitioner: Zurdo Keys MD  Diagnosis/ ICD10 code: Primary osteoarthritis of right knee [M17.11]  Today's Date: 2023    VISIT#: 6    Subjective   Pt reports: doing pretty well this morning. Walked five thousands steps yesterday.       Objective     See Exercise, Manual, and Modality Logs for complete treatment.     Patient Education:    Assessment & Plan       Assessment  Assessment details: Pt tolerating progression of her exercise program. Making steady progress.         Progress per Plan of Care            Timed:         Manual Therapy:   5     mins  27347;     Therapeutic Exercise:   18      mins  61751;     Neuromuscular Demetrius:        mins  81589;    Therapeutic Activity:     12     mins  40071;     Gait Training:           mins  52994;     Ultrasound:          mins  28827;    Ionto                                   mins   15500  Self Care                            mins   77500      Un-Timed:  Electrical Stimulation:         mins  78741 ( );  Traction          mins 38679  Canal repositioning           mins    17897        Timed Treatment:  35    mins   Total Treatment:    35    mins    Alex Al PT, CLT  Physical Therapist  [place] License:  # [id number]

## 2023-09-29 ENCOUNTER — TREATMENT (OUTPATIENT)
Dept: PHYSICAL THERAPY | Facility: CLINIC | Age: 73
End: 2023-09-29
Payer: MEDICARE

## 2023-09-29 DIAGNOSIS — Z96.651 TOTAL KNEE REPLACEMENT STATUS, RIGHT: ICD-10-CM

## 2023-09-29 DIAGNOSIS — M17.11 PRIMARY OSTEOARTHRITIS OF RIGHT KNEE: Primary | ICD-10-CM

## 2023-09-29 PROCEDURE — 97530 THERAPEUTIC ACTIVITIES: CPT | Performed by: PHYSICAL THERAPIST

## 2023-09-29 PROCEDURE — G0283 ELEC STIM OTHER THAN WOUND: HCPCS | Performed by: PHYSICAL THERAPIST

## 2023-09-29 PROCEDURE — 97110 THERAPEUTIC EXERCISES: CPT | Performed by: PHYSICAL THERAPIST

## 2023-09-29 NOTE — PROGRESS NOTES
Physical Therapy Daily Treatment Note    [address] [facility], [address]  [place] [address] [phone]    Patient: Africa Licona  : 1950  Referring practitioner: Zurdo Keys MD  Diagnosis/ ICD10 code: Primary osteoarthritis of right knee [M17.11]  Today's Date: 2023    VISIT#: 7    Subjective   Pt reports: had a MD f/u apt yesterday, he was very happy with her progress and ROM. He said she has sprained a lig when she twisted her ankle and that is why she is having pain. He fitted her with a knee brace to provide support and stability.       Objective          Active Range of Motion     Right Knee   Flexion: 115 degrees   Extension: 0 degrees     Additional Active Range of Motion Details  In supine      See Exercise, Manual, and Modality Logs for complete treatment.     Patient Education:    Assessment & Plan       Assessment  Assessment details: Pt tolerated today's rx session well without any issues or increase pain. Making steady progress. All STGs met, making progress towards LTGs.     Goals  Plan Goals:  STGs: in 4 weeks     1- Pt will tolerate  initial HEP and progression of her exercise program- MET  2- Pt will be I with initial HEP- MET  3- Pt will report at least 25 % improvement and pain reduction- MET  4- R knee flex will be 115 or better- MET  5- R knee ext will be 0- MET     LTGs: by DC      1- Pt will report at least 85 % improvement and pain reduction  2- Pt will be I with final HEP and self management of her condition  3- Pt will have improved LEFS score indicating improved function, pain and symptom reduction  4- Pt will voice readiness for DC with I program   5- R knee strength will be 5/5  6- Pt will be able to negotiate stairs without inc pain    7- Pt will ambulate with normal gait pattern             Progress per Plan of Care            Timed:         Manual Therapy:         mins  38129;     Therapeutic Exercise:   18      mins  41734;     Neuromuscular Demetrius:        mins   62651;    Therapeutic Activity:    12      mins  93644;     Gait Training:           mins  61059;     Ultrasound:          mins  10408;    Ionto                                   mins   73788  Self Care                            mins   29032      Un-Timed:  Electrical Stimulation:   20      mins  29698 ( );  Traction          mins 10433  Canal repositioning           mins    90371        Timed Treatment:  30    mins   Total Treatment:     50   mins    Alex lA PT, CLT  Physical Therapist  [place] License:  # [id number]

## 2023-10-04 ENCOUNTER — TREATMENT (OUTPATIENT)
Dept: PHYSICAL THERAPY | Facility: CLINIC | Age: 73
End: 2023-10-04
Payer: MEDICARE

## 2023-10-04 DIAGNOSIS — Z96.651 TOTAL KNEE REPLACEMENT STATUS, RIGHT: ICD-10-CM

## 2023-10-04 DIAGNOSIS — M17.11 PRIMARY OSTEOARTHRITIS OF RIGHT KNEE: Primary | ICD-10-CM

## 2023-10-04 PROCEDURE — 97110 THERAPEUTIC EXERCISES: CPT | Performed by: PHYSICAL THERAPIST

## 2023-10-04 PROCEDURE — 97112 NEUROMUSCULAR REEDUCATION: CPT | Performed by: PHYSICAL THERAPIST

## 2023-10-04 PROCEDURE — G0283 ELEC STIM OTHER THAN WOUND: HCPCS | Performed by: PHYSICAL THERAPIST

## 2023-10-04 NOTE — PROGRESS NOTES
Physical Therapy Daily Treatment Note     [facility] [facility] [address]  [place] [address]  [phone]    Patient: Africa Licnoa  : 1950  Referring practitioner: Zurdo Keys MD  Diagnosis/ ICD10 code: Primary osteoarthritis of right knee [M17.11]  Today's Date: 10/4/2023    VISIT#: 8    Subjective   Pt reports: doing better, the pain in R knee from twisting it last week is much better and the brace helps a lot. Feels ES was beneficial.       Objective     See Exercise, Manual, and Modality Logs for complete treatment.     Patient Education:    Assessment & Plan       Assessment  Assessment details: Pt tolerated today's rx session well. Minimal pain reported today. Making steady progress.         Progress per Plan of Care            Timed:         Manual Therapy:         mins  75181;     Therapeutic Exercise:    18     mins  42850;     Neuromuscular Demetrius:   12     mins  89146;    Therapeutic Activity:          mins  72398;     Gait Training:           mins  79050;     Ultrasound:          mins  99038;    Ionto                                   mins   40572  Self Care                            mins   18153      Un-Timed:  Electrical Stimulation:   20      mins  34111 ( );  Traction          mins 94122  Canal repositioning           mins    01068        Timed Treatment:  30    mins   Total Treatment:    50    mins    Alex Al PT, CLT  Physical Therapist  [place] License:  # [id number]

## 2023-10-06 ENCOUNTER — TELEPHONE (OUTPATIENT)
Dept: PHYSICAL THERAPY | Facility: CLINIC | Age: 73
End: 2023-10-06

## 2023-10-06 NOTE — TELEPHONE ENCOUNTER
"  Caller: Africa Licona \"Lon\"    Relationship: Self         What was the call regarding: HAVING V[place]IG[place] THIS AM          "

## 2023-10-17 ENCOUNTER — TREATMENT (OUTPATIENT)
Dept: PHYSICAL THERAPY | Facility: CLINIC | Age: 73
End: 2023-10-17
Payer: MEDICARE

## 2023-10-17 DIAGNOSIS — M17.11 PRIMARY OSTEOARTHRITIS OF RIGHT KNEE: Primary | ICD-10-CM

## 2023-10-17 DIAGNOSIS — Z96.651 TOTAL KNEE REPLACEMENT STATUS, RIGHT: ICD-10-CM

## 2023-10-17 PROCEDURE — 97112 NEUROMUSCULAR REEDUCATION: CPT | Performed by: PHYSICAL THERAPIST

## 2023-10-17 PROCEDURE — G0283 ELEC STIM OTHER THAN WOUND: HCPCS | Performed by: PHYSICAL THERAPIST

## 2023-10-17 PROCEDURE — 97110 THERAPEUTIC EXERCISES: CPT | Performed by: PHYSICAL THERAPIST

## 2023-10-17 NOTE — PROGRESS NOTES
Physical Therapy Daily Treatment Note    [address] [facility], [address]  [place] [address]  [phone]    Patient: Africa Licona  : 1950  Referring practitioner: Zurdo Keys MD  Diagnosis/ ICD10 code: Primary osteoarthritis of right knee [M17.11]  Today's Date: 10/17/2023    VISIT#: 9    Subjective   Pt reports: doing ok, no more dizziness since she hasn't taken any more pain pills.       Objective     See Exercise, Manual, and Modality Logs for complete treatment.     Patient Education:    Assessment & Plan       Assessment  Assessment details: Pt tolerated today's rx session well. Making steady progress.           Progress per Plan of Care/ possible DC next visit            Timed:         Manual Therapy:         mins  72340;     Therapeutic Exercise:    18     mins  45992;     Neuromuscular Demetrius:  12      mins  16869;    Therapeutic Activity:          mins  08518;     Gait Training:           mins  18350;     Ultrasound:          mins  03457;    Ionto                                   mins   49942  Self Care                            mins   93753      Un-Timed:  Electrical Stimulation:   20      mins  94049 ( );  Traction          mins 82728  Canal repositioning           mins    91212        Timed Treatment:  30    mins   Total Treatment:    50    mins    Alex Al PT, CLT  Physical Therapist  [place] License:  # [id number]

## 2023-10-20 ENCOUNTER — TREATMENT (OUTPATIENT)
Dept: PHYSICAL THERAPY | Facility: CLINIC | Age: 73
End: 2023-10-20
Payer: MEDICARE

## 2023-10-20 DIAGNOSIS — M17.11 PRIMARY OSTEOARTHRITIS OF RIGHT KNEE: Primary | ICD-10-CM

## 2023-10-20 DIAGNOSIS — Z96.651 TOTAL KNEE REPLACEMENT STATUS, RIGHT: ICD-10-CM

## 2023-10-20 DIAGNOSIS — G89.29 CHRONIC PAIN OF RIGHT KNEE: ICD-10-CM

## 2023-10-20 DIAGNOSIS — M25.561 CHRONIC PAIN OF RIGHT KNEE: ICD-10-CM

## 2023-10-20 PROCEDURE — 97110 THERAPEUTIC EXERCISES: CPT | Performed by: PHYSICAL THERAPIST

## 2023-10-20 PROCEDURE — 97530 THERAPEUTIC ACTIVITIES: CPT | Performed by: PHYSICAL THERAPIST

## 2023-10-20 NOTE — PROGRESS NOTES
Physical Therapy Progress Note/ DC summary     [facility], [address]  [place] [address]  [phone]    Patient: Africa Licona  : 1950  Referring practitioner: Zurdo Keys MD  Diagnosis/ ICD10 code: Primary osteoarthritis of right knee [M17.11]  Today's Date: 10/20/2023    VISIT#: 10    Subjective Evaluation    Pain  Current pain ratin  At best pain ratin  At worst pain ratin         Pt reports: doing pretty well, is ready to be DC. Did some yard work yesterday and had some increase pain last night. It still gets stiff , can't sit for long.       Objective          Active Range of Motion     Right Knee   Flexion: 115 degrees   Extension: 0 degrees     Additional Active Range of Motion Details  In supine    Strength/Myotome Testing     Right Knee   Normal strength        See Exercise, Manual, and Modality Logs for complete treatment.     Patient Education:    Assessment & Plan       Assessment  Assessment details: Pt has responded well to therapy with improved ROM/ strength, gait and functional mobility. Voices readiness for DC to continue with her HEP and self management of her condition. Has met all STGs and LTGs.     Goals  Plan Goals: Plan Goals: STGs: in 4 weeks  ( all MET)     1- Pt will tolerate  initial HEP and progression of her exercise program  2- Pt will be I with initial HEP  3- Pt will report at least 25 % improvement and pain reduction  4- R knee flex will be 115 or better  5- R knee ext will be 0     LTGs: by DC ( all MET)     1- Pt will report at least 85 % improvement and pain reduction  2- Pt will be I with final HEP and self management of her condition  3- Pt will have improved LEFS score indicating improved function, pain and symptom reduction  4- Pt will voice readiness for DC with I program   5- R knee strength will be 5/5  6- Pt will be able to negotiate stairs without inc pain    7- Pt will ambulate with normal gait pattern          Plan  Plan details: Pt will  be DC                     Timed:         Manual Therapy:         mins  63749;     Therapeutic Exercise:   20      mins  25984;     Neuromuscular Demetrius:        mins  25704;    Therapeutic Activity:    10      mins  21291;     Gait Training:           mins  90410;     Ultrasound:          mins  54244;    Ionto                                   mins   13298  Self Care                            mins   49490      Un-Timed:  Electrical Stimulation:         mins  24105 ( );  Traction          mins 07209  Canal repositioning           mins    15718  Low Eval          Mins  37391  Mod Eval          Mins  00772  High Eval                            Mins  97978  Re-Eval                               mins  15700    Timed Treatment:  30    mins   Total Treatment:    30    mins    Alex Al PT, CLT  Physical Therapist  [place] License:  # [id number]

## 2024-05-13 RX ORDER — OMEPRAZOLE 20 MG/1
CAPSULE, DELAYED RELEASE ORAL
Qty: 90 CAPSULE | Refills: 1 | Status: SHIPPED | OUTPATIENT
Start: 2024-05-13

## 2024-05-16 ENCOUNTER — TREATMENT (OUTPATIENT)
Dept: PHYSICAL THERAPY | Facility: CLINIC | Age: 74
End: 2024-05-16
Payer: MEDICARE

## 2024-05-16 ENCOUNTER — TRANSCRIBE ORDERS (OUTPATIENT)
Dept: PHYSICAL THERAPY | Facility: CLINIC | Age: 74
End: 2024-05-16
Payer: MEDICARE

## 2024-05-16 DIAGNOSIS — Z96.651 STATUS POST TOTAL KNEE REPLACEMENT, RIGHT: Primary | ICD-10-CM

## 2024-05-16 DIAGNOSIS — G89.29 CHRONIC PAIN OF RIGHT KNEE: ICD-10-CM

## 2024-05-16 DIAGNOSIS — M25.561 CHRONIC PAIN OF RIGHT KNEE: ICD-10-CM

## 2024-05-16 PROCEDURE — 97035 APP MDLTY 1+ULTRASOUND EA 15: CPT | Performed by: PHYSICAL THERAPIST

## 2024-05-16 PROCEDURE — 97110 THERAPEUTIC EXERCISES: CPT | Performed by: PHYSICAL THERAPIST

## 2024-05-16 PROCEDURE — 97161 PT EVAL LOW COMPLEX 20 MIN: CPT | Performed by: PHYSICAL THERAPIST

## 2024-05-16 NOTE — PROGRESS NOTES
Physical Therapy Initial Evaluation and Plan of Care    Patient: Africa Licona   : 1950  Diagnosis/ICD-10 Code:  Status post total knee replacement, right [Z96.651]  Referring practitioner: Zurdo Keys MD  Date of Initial Visit: 2024  Today's Date: 2024  Patient seen for 1 sessions           Subjective Questionnaire: LEFS: 66%      Subjective Evaluation    History of Present Illness  Mechanism of injury: Pt is referred to therapy due to pain in R knee.  She had a TKR last Aug and was DC from therapy in Oct.   Reports she continues to have constant pain in R knee mostly lateral aspects and occasionally down below the knee and above the knee to the lateral hip. She has clicking in the knee. Unable to get comfortable at night. Has to use the railing to go up/down stairs.     Onset of symptoms : Aug 2023    Aggravating factors: twisting, bending, lying flat for too long, being on it too long.     Relieving factors: rest it, having a pillow under the knee    Functional limitation: riding her bike, taking long walks, hiking, water Aerobics.     PMH: L TKR, arthritis        Pain  Current pain ratin  At best pain ratin  At worst pain ratin  Quality: sharp, dull ache and radiating    Social Support  Lives with: significant other    Treatments  Previous treatment: physical therapy  Patient Goals  Patient goals for therapy: decreased pain, increased motion, increased strength and return to sport/leisure activities  Patient goal: be able to ride her bike       Precautions: s/p R TKR     Objective          Tenderness     Right Knee   Tenderness in the ITB, lateral joint line, lateral patella and patellar tendon.     Active Range of Motion     Lumbar   Flexion: WFL  Extension: WFL    Right Hip   Normal active range of motion    Right Knee   Flexion: 105 degrees with pain  Extension: 5 degrees     Additional Active Range of Motion Details  No pain with lumbar ROM and no change in symptoms in R  knee/ LE  Measured in supine    Strength/Myotome Testing     Right Hip   Normal muscle strength    Right Knee   Flexion: 5  Extension: 5  Quadriceps contraction: fair    Additional Strength Details  Increase pain lateral knee joint with MMT R hip     Increase pain with ER     Right Hip Flexibility Comments:   Moderated tightness R HS/ ITB/ Quads          Assessment & Plan       Assessment  Impairments: abnormal muscle firing, abnormal or restricted ROM, activity intolerance, lacks appropriate home exercise program, pain with function and weight-bearing intolerance   Functional limitations: sleeping, walking, uncomfortable because of pain, sitting, standing and stooping   Assessment details: Pt is a 73 y.o. female referred to therapy due to chronic pain in R knee s/p TKR in Aug of 2023. Pt presents with impaired ROM/ and Quad strength, impaired flexibility and mod TTP lateral joint line.   Upon initial evaluation pt exhibits the above impairments and functional limitations. Impairments affect performing her normal/ daily activities without pain, sleep through the night and ride her bike.  . Pt is a good candidate for rehab and will benefit from skilled physical therapy to address impairments, resolve pain and maximize function.    Prognosis: good    Goals  Plan Goals: STGs: in 4 weeks    1- Pt will tolerate  initial HEP and progression of her exercise program  2- Pt will be I with initial HEP  3- Pt will report at least 25% improvement and pain reduction  4- R knee flex will be 110 deg or better    LTGs: by DC     1- Pt will report at least 75 % improvement and pain reduction  2- Pt will be I with final HEP and self management of her condition  3- Pt will have improved LEFS  score indicating improved function, pain and symptom reduction  4- Pt will voice readiness for DC with I program   5- R knee flex will be 120 deg  6- Pt will be able to negotiate stairs with min pain      Plan  Therapy options: will be seen for  skilled therapy services  Planned modality interventions: high voltage pulsed current (pain management), ultrasound and cryotherapy  Planned therapy interventions: functional ROM exercises, gait training, home exercise program, manual therapy, neuromuscular re-education, strengthening, therapeutic activities and postural training  Frequency: 2x week  Duration in weeks: 12  Treatment plan discussed with: patient        See flow sheet for treatment detail    History # of Personal Factors and/or Comorbidities: LOW (0)  Examination of Body System(s): # of elements: LOW (1-2)  Clinical Presentation: STABLE   Clinical Decision Making: LOW           Timed:         Manual Therapy:         mins  98746;     Therapeutic Exercise:  15       mins  71806;     Neuromuscular Demetrius:        mins  72292;    Therapeutic Activity:          mins  59083;     Gait Training:           mins  12744;     Ultrasound:  10        mins  79028;    Ionto                                   mins   34630  Self Care                            mins   41558        Un-Timed:  Electrical Stimulation:         mins  83686 ( );  Dry Needling          mins self-pay  Traction          mins 43569  Canal repositioning           mins    65242  Low Eval    25      Mins  21616  Mod Eval          Mins  75659  High Eval                            Mins  49401  Re-Eval                               mins  16019        Timed Treatment:  25    mins   Total Treatment:     50   mins    PT SIGNATURE: Alex Al PT, CLT  [place] License: # [id number]     DATE TREATMENT INITIATED: 5/16/2024    Initial Certification  Certification Period: 5/16/2024 thru 8/13/2024  I certify that the therapy services are furnished while this patient is under my care.  The services outlined above are required by this patient, and will be reviewed every 90 days.     PHYSICIAN: Zurdo Keys MD   NPI: [id number]                                      DATE:     Please sign and return via fax  to [phone].. Thank you, [facility].

## 2024-05-21 ENCOUNTER — TREATMENT (OUTPATIENT)
Dept: PHYSICAL THERAPY | Facility: CLINIC | Age: 74
End: 2024-05-21
Payer: MEDICARE

## 2024-05-21 DIAGNOSIS — G89.29 CHRONIC PAIN OF RIGHT KNEE: ICD-10-CM

## 2024-05-21 DIAGNOSIS — Z96.651 STATUS POST TOTAL KNEE REPLACEMENT, RIGHT: Primary | ICD-10-CM

## 2024-05-21 DIAGNOSIS — M25.561 CHRONIC PAIN OF RIGHT KNEE: ICD-10-CM

## 2024-05-21 PROCEDURE — 97035 APP MDLTY 1+ULTRASOUND EA 15: CPT | Performed by: PHYSICAL THERAPIST

## 2024-05-21 PROCEDURE — 97110 THERAPEUTIC EXERCISES: CPT | Performed by: PHYSICAL THERAPIST

## 2024-05-21 NOTE — PROGRESS NOTES
Physical Therapy Daily Treatment Note    [facility] [facility], [address]  [place] [address]  [phone]    Patient: Africa Licona  : 1950  Referring practitioner: Zurdo Keys MD  Diagnosis/ ICD10 code: Status post total knee replacement, right [Z96.651]  Today's Date: 2024    VISIT#: 2    Subjective   Pt reports: her knee is a little better, the US helped. She didn't have the achy feeling.       Objective     See Exercise, Manual, and Modality Logs for complete treatment.     Patient Education:    Assessment & Plan       Assessment  Assessment details: Seems to be responding to therapy. Good initial respond to US.           Progress per Plan of Care            Timed:         Manual Therapy:   5      mins  04876;     Therapeutic Exercise:   18     mins  97417;     Neuromuscular Demetrius:        mins  86928;    Therapeutic Activity:          mins  11086;     Gait Training:           mins  61344;     Ultrasound:    10      mins  72861;    Ionto                                   mins   29828  Self Care                            mins   12754      Un-Timed:  Electrical Stimulation:         mins  16335 ( );  Traction          mins 95903  Canal repositioning           mins    08746        Timed Treatment:  33    mins   Total Treatment:    33    mins    Alex Al PT, CLT  Physical Therapist  [place] License:  # [id number]

## 2024-05-23 ENCOUNTER — TREATMENT (OUTPATIENT)
Dept: PHYSICAL THERAPY | Facility: CLINIC | Age: 74
End: 2024-05-23
Payer: MEDICARE

## 2024-05-23 DIAGNOSIS — M25.561 CHRONIC PAIN OF RIGHT KNEE: ICD-10-CM

## 2024-05-23 DIAGNOSIS — Z96.651 STATUS POST TOTAL KNEE REPLACEMENT, RIGHT: Primary | ICD-10-CM

## 2024-05-23 DIAGNOSIS — G89.29 CHRONIC PAIN OF RIGHT KNEE: ICD-10-CM

## 2024-05-23 PROCEDURE — 97530 THERAPEUTIC ACTIVITIES: CPT | Performed by: PHYSICAL THERAPIST

## 2024-05-23 PROCEDURE — 97110 THERAPEUTIC EXERCISES: CPT | Performed by: PHYSICAL THERAPIST

## 2024-05-23 PROCEDURE — 97035 APP MDLTY 1+ULTRASOUND EA 15: CPT | Performed by: PHYSICAL THERAPIST

## 2024-05-23 NOTE — PROGRESS NOTES
Physical Therapy Daily Treatment Note    [facility] [facility], [address]  [place] [address]  [phone]    Patient: Africa Licona  : 1950  Referring practitioner: Zurdo Keys MD  Diagnosis/ ICD10 code: Status post total knee replacement, right [Z96.651]  Today's Date: 2024    VISIT#: 3    Subjective   Pt reports: seems to be doing a little better. Might have overdone the stretches.       Objective     See Exercise, Manual, and Modality Logs for complete treatment.     Patient Education:    Assessment & Plan       Assessment  Assessment details: Pt seems to be responding well to therapy. Much easier to get started on the bike today. Less pain is reported.           Progress per Plan of Care            Timed:         Manual Therapy:         mins  42185;     Therapeutic Exercise:   18      mins  77796;     Neuromuscular Demetrius:        mins  89595;    Therapeutic Activity:    10      mins  16659;     Gait Training:           mins  50419;     Ultrasound:    10      mins  55045;    Ionto                                   mins   17561  Self Care                            mins   03625      Un-Timed:  Electrical Stimulation:         mins  55866 ( );  Traction          mins 49971  Canal repositioning           mins    92937        Timed Treatment:  38    mins   Total Treatment:     38   mins    Alex Al PT, CLT  Physical Therapist  [place] License:  # [id number]

## 2024-05-29 ENCOUNTER — TREATMENT (OUTPATIENT)
Dept: PHYSICAL THERAPY | Facility: CLINIC | Age: 74
End: 2024-05-29
Payer: MEDICARE

## 2024-05-29 DIAGNOSIS — M25.561 CHRONIC PAIN OF RIGHT KNEE: ICD-10-CM

## 2024-05-29 DIAGNOSIS — G89.29 CHRONIC PAIN OF RIGHT KNEE: ICD-10-CM

## 2024-05-29 DIAGNOSIS — Z96.651 STATUS POST TOTAL KNEE REPLACEMENT, RIGHT: Primary | ICD-10-CM

## 2024-05-29 PROCEDURE — 97110 THERAPEUTIC EXERCISES: CPT | Performed by: PHYSICAL THERAPIST

## 2024-05-29 PROCEDURE — 97530 THERAPEUTIC ACTIVITIES: CPT | Performed by: PHYSICAL THERAPIST

## 2024-05-29 PROCEDURE — 97035 APP MDLTY 1+ULTRASOUND EA 15: CPT | Performed by: PHYSICAL THERAPIST

## 2024-05-29 NOTE — PROGRESS NOTES
Physical Therapy Daily Treatment Note    [facility] [facility], [address]  [place] [address]  [phone]    Patient: Africa Licona  : 1950  Referring practitioner: Zurdo Keys MD  Diagnosis/ ICD10 code: Status post total knee replacement, right [Z96.651]  Today's Date: 2024    VISIT#: 4    Subjective   Pt reports: doing better. Therapy is helping.       Objective     See Exercise, Manual, and Modality Logs for complete treatment. Continued with treatment as noted.     Patient Education:    Assessment & Plan       Assessment  Assessment details: Pt tolerated today's rx session well. Seems to be responding well to therapy. Subjective improvement is reported.           Progress per Plan of Care            Timed:         Manual Therapy:   3      mins  84449;     Therapeutic Exercise:   15      mins  39747;     Neuromuscular Demetrius:        mins  27847;    Therapeutic Activity:    10      mins  43342;     Gait Training:           mins  23609;     Ultrasound:   10       mins  05017;    Ionto                                   mins   80165  Self Care                            mins   51700      Un-Timed:  Electrical Stimulation:         mins  21198 ( );  Traction          mins 20223  Canal repositioning           mins    53535        Timed Treatment: 38     mins   Total Treatment:    38    mins    Alex Al, PT, CLT  Physical Therapist  [place] License:  # [id number]

## 2024-05-31 ENCOUNTER — TREATMENT (OUTPATIENT)
Dept: PHYSICAL THERAPY | Facility: CLINIC | Age: 74
End: 2024-05-31
Payer: MEDICARE

## 2024-05-31 DIAGNOSIS — G89.29 CHRONIC PAIN OF RIGHT KNEE: ICD-10-CM

## 2024-05-31 DIAGNOSIS — Z96.651 STATUS POST TOTAL KNEE REPLACEMENT, RIGHT: Primary | ICD-10-CM

## 2024-05-31 DIAGNOSIS — M25.561 CHRONIC PAIN OF RIGHT KNEE: ICD-10-CM

## 2024-05-31 PROCEDURE — 97110 THERAPEUTIC EXERCISES: CPT | Performed by: PHYSICAL THERAPIST

## 2024-05-31 PROCEDURE — 97530 THERAPEUTIC ACTIVITIES: CPT | Performed by: PHYSICAL THERAPIST

## 2024-05-31 NOTE — PROGRESS NOTES
Physical Therapy Daily Treatment Note    [facility] [facility], [address]  [place] [address]  [phone]    Patient: Africa Licona  : 1950  Referring practitioner: Zurdo Keys MD  Diagnosis/ ICD10 code: Status post total knee replacement, right [Z96.651]  Today's Date: 2024    VISIT#: 5    Subjective   Pt reports: worked in the yard yesterday and woke up this morning coughing and sneezing. Her knee is doing better.       Objective     See Exercise, Manual, and Modality Logs for complete treatment.     Patient Education:    Assessment & Plan       Assessment  Assessment details: Pt tolerated today's rx session well. Responding well to therapy. Subjective improvement is reported.           Progress per Plan of Care            Timed:         Manual Therapy:         mins  31375;     Therapeutic Exercise:   15      mins  20562;     Neuromuscular Demetrius:        mins  88473;    Therapeutic Activity:   10       mins  10491;     Gait Training:           mins  69108;     Ultrasound:   10       mins  72146;    Ionto                                   mins   64106  Self Care                            mins   44872      Un-Timed:  Electrical Stimulation:         mins  74190 ( );  Traction          mins 86412  Canal repositioning           mins    84166        Timed Treatment: 35     mins   Total Treatment:    35    mins    Alex Al PT, CLT  Physical Therapist  [place] License:  # [id number]

## 2024-06-04 ENCOUNTER — TREATMENT (OUTPATIENT)
Dept: PHYSICAL THERAPY | Facility: CLINIC | Age: 74
End: 2024-06-04
Payer: MEDICARE

## 2024-06-04 DIAGNOSIS — Z96.651 STATUS POST TOTAL KNEE REPLACEMENT, RIGHT: Primary | ICD-10-CM

## 2024-06-04 DIAGNOSIS — M25.561 CHRONIC PAIN OF RIGHT KNEE: ICD-10-CM

## 2024-06-04 DIAGNOSIS — G89.29 CHRONIC PAIN OF RIGHT KNEE: ICD-10-CM

## 2024-06-04 PROCEDURE — 97110 THERAPEUTIC EXERCISES: CPT | Performed by: PHYSICAL THERAPIST

## 2024-06-04 PROCEDURE — 97530 THERAPEUTIC ACTIVITIES: CPT | Performed by: PHYSICAL THERAPIST

## 2024-06-04 PROCEDURE — 97035 APP MDLTY 1+ULTRASOUND EA 15: CPT | Performed by: PHYSICAL THERAPIST

## 2024-06-04 NOTE — PROGRESS NOTES
Physical Therapy Daily Treatment Note     [facility], [address]  [place] [address]  [phone]    Patient: Africa Licona  : 1950  Referring practitioner: Zurdo Keys MD  Diagnosis/ ICD10 code: Status post total knee replacement, right [Z96.651]  Today's Date: 2024    VISIT#: 6    Subjective   Pt reports: doing better , feels therapy has been very beneficial. It doesn't wake her up at night anymore.       Objective          Active Range of Motion     Right Knee   Flexion: 120 degrees   Extension: 0 degrees     Additional Active Range of Motion Details  Measured in supine        See Exercise, Manual, and Modality Logs for complete treatment. Continued/ progressed as noted.     Patient Education:    Assessment & Plan       Assessment  Assessment details: Pt tolerated today's rx session well. She is making steady progress. Increase ROM and decrease pain. She has met all STGs making progress towards LTGs.     Goals  Plan Goals: Plan Goals: STGs: in 4 weeks     1- Pt will tolerate  initial HEP and progression of her exercise program- MET  2- Pt will be I with initial HEP- MET  3- Pt will report at least 25% improvement and pain reduction- MET  4- R knee flex will be 110 deg or better- MET     LTGs: by DC      1- Pt will report at least 75 % improvement and pain reduction  2- Pt will be I with final HEP and self management of her condition  3- Pt will have improved LEFS  score indicating improved function, pain and symptom reduction  4- Pt will voice readiness for DC with I program   5- R knee flex will be 120 deg  6- Pt will be able to negotiate stairs with min pain                 Progress per Plan of Care            Timed:         Manual Therapy:   5      mins  01215;     Therapeutic Exercise:   13      mins  86517;     Neuromuscular Demetrius:        mins  82906;    Therapeutic Activity:    10      mins  69421;     Gait Training:           mins  32703;     Ultrasound:    10      mins  69522;     Ionto                                   mins   03370  Self Care                            mins   70666      Un-Timed:  Electrical Stimulation:         mins  72092 ( );  Traction          mins 88847  Canal repositioning           mins    27117        Timed Treatment:  38    mins   Total Treatment:    38    mins    Alex Al PT, CLT  Physical Therapist  [place] License:  # [id number]

## 2024-06-13 ENCOUNTER — TREATMENT (OUTPATIENT)
Dept: PHYSICAL THERAPY | Facility: CLINIC | Age: 74
End: 2024-06-13
Payer: MEDICARE

## 2024-06-13 DIAGNOSIS — Z96.651 STATUS POST TOTAL KNEE REPLACEMENT, RIGHT: Primary | ICD-10-CM

## 2024-06-13 DIAGNOSIS — M25.561 CHRONIC PAIN OF RIGHT KNEE: ICD-10-CM

## 2024-06-13 DIAGNOSIS — G89.29 CHRONIC PAIN OF RIGHT KNEE: ICD-10-CM

## 2024-06-13 NOTE — PROGRESS NOTES
Physical Therapy Daily Treatment Note/ PN    [address] [facility], [address]  [place] [address]  [phone]    Patient: Africa Licona  : 1950  Referring practitioner: Zurdo Keys MD  Diagnosis/ ICD10 code: Status post total knee replacement, right [Z96.651]  Today's Date: 2024    VISIT#: 7    Subjective   Pt reports: doing really well , very pleased with the out come of her therapy. Doesn't have pain unless bends it too far or do something that it doesn't like. It doesn't wake her up at night anymore. Feels 100% improved. Feels the stretches and exercises have helped significantly. Will see the Dr on Monday if he wants her to continue with therapy she will call and make more apts.       Objective          Active Range of Motion     Right Knee   Flexion: 130 degrees   Extension: 0 degrees     Additional Active Range of Motion Details  Measured in supine        See Exercise, Manual, and Modality Logs for complete treatment.     Patient Education:    Assessment & Plan       Assessment  Assessment details: Pt has been seen for 7 visits. She has responded well to therapy. Reports 100% improvement , no pain at night. Significant improvement in her R knee ROM.  Has met all STGs and LTGs.     Goals  Plan Goals: STGs: in 4 weeks ( all MET)     1- Pt will tolerate  initial HEP and progression of her exercise program  2- Pt will be I with initial HEP  3- Pt will report at least 25% improvement and pain reduction  4- R knee flex will be 110 deg or better     LTGs: by DC ( all MET)     1- Pt will report at least 75 % improvement and pain reduction  2- Pt will be I with final HEP and self management of her condition  3- Pt will have improved LEFS  score indicating improved function, pain and symptom reduction  4- Pt will voice readiness for DC with I program   5- R knee flex will be 120 deg  6- Pt will be able to negotiate stairs with min pain            Plan  Plan details: No more apts scheduled. Pt to call  after her f/u visit with MD on Monday to make more apts or be DC.           Progress per Plan of Care            Timed:         Manual Therapy:         mins  50317;     Therapeutic Exercise:   13      mins  32545;     Neuromuscular Demetrius:        mins  76425;    Therapeutic Activity:    10      mins  26187;     Gait Training:           mins  66424;     Ultrasound:    10      mins  50425;    Ionto                                   mins   88602  Self Care                            mins   93568      Un-Timed:  Electrical Stimulation:         mins  71301 ( );  Traction          mins 13969  Canal repositioning           mins    10828        Timed Treatment:  33    mins   Total Treatment:    33    mins    Alex Al PT, CLT  Physical Therapist  [place] License:  # [id number]

## 2024-07-24 RX ORDER — LEVOTHYROXINE SODIUM 0.15 MG/1
150 TABLET ORAL DAILY
Qty: 90 TABLET | Refills: 0 | Status: SHIPPED | OUTPATIENT
Start: 2024-07-24

## 2024-07-24 RX ORDER — LISINOPRIL 10 MG/1
10 TABLET ORAL DAILY
Qty: 90 TABLET | Refills: 0 | Status: SHIPPED | OUTPATIENT
Start: 2024-07-24

## 2024-07-24 NOTE — TELEPHONE ENCOUNTER
I refilled her medicines but it has been over a year since she has been here.  She needs to schedule an appointment to see me.

## 2024-07-26 NOTE — TELEPHONE ENCOUNTER
Left a detailed vm with this information and stated to call the office to schedule her wellness exam

## 2024-09-17 ENCOUNTER — OFFICE VISIT (OUTPATIENT)
Dept: FAMILY MEDICINE CLINIC | Facility: CLINIC | Age: 74
End: 2024-09-17
Payer: MEDICARE

## 2024-09-17 VITALS
WEIGHT: 184.5 LBS | HEIGHT: 67 IN | TEMPERATURE: 98.7 F | OXYGEN SATURATION: 94 % | BODY MASS INDEX: 28.96 KG/M2 | HEART RATE: 71 BPM | DIASTOLIC BLOOD PRESSURE: 92 MMHG | SYSTOLIC BLOOD PRESSURE: 152 MMHG

## 2024-09-17 DIAGNOSIS — J06.9 ACUTE URI: Primary | ICD-10-CM

## 2024-09-17 PROCEDURE — 3080F DIAST BP >= 90 MM HG: CPT | Performed by: FAMILY MEDICINE

## 2024-09-17 PROCEDURE — 99213 OFFICE O/P EST LOW 20 MIN: CPT | Performed by: FAMILY MEDICINE

## 2024-09-17 PROCEDURE — 3077F SYST BP >= 140 MM HG: CPT | Performed by: FAMILY MEDICINE

## 2024-09-17 RX ORDER — BENZONATATE 200 MG/1
200 CAPSULE ORAL 3 TIMES DAILY PRN
Qty: 30 CAPSULE | Refills: 0 | Status: SHIPPED | OUTPATIENT
Start: 2024-09-17

## 2024-09-17 RX ORDER — AMOXICILLIN 500 MG/1
500 CAPSULE ORAL 3 TIMES DAILY
Qty: 30 CAPSULE | Refills: 0 | Status: SHIPPED | OUTPATIENT
Start: 2024-09-17

## 2024-09-17 NOTE — PROGRESS NOTES
"Chief Complaint  Sore Throat (On going since 09- on going since - NEG for COVID ), Cough, and Dizziness    Subjective        Africa Licona presents to [facility]  Sore Throat   This is a new problem. The current episode started 1 to 4 weeks ago. The problem has been unchanged. The maximum temperature recorded prior to her arrival was 100 - 101 F. Associated symptoms include congestion, coughing and a hoarse voice. Pertinent negatives include no ear pain.   Cough  The cough is Productive of sputum. Associated symptoms include a sore throat. Pertinent negatives include no ear pain.   Dizziness  Associated symptoms include congestion, coughing and a sore throat.       Objective   Vital Signs:  /92 (BP Location: Left arm, Patient Position: Sitting, Cuff Size: Large Adult)   Pulse 71   Temp 98.7 °F (37.1 °C) (Infrared)   Ht 170.2 cm (67\")   Wt 83.7 kg (184 lb 8 oz)   SpO2 94%   BMI 28.90 kg/m²   Estimated body mass index is 28.9 kg/m² as calculated from the following:    Height as of this encounter: 170.2 cm (67\").    Weight as of this encounter: 83.7 kg (184 lb 8 oz).    BMI is >= 25 and <30. (Overweight) The following options were offered after discussion;: exercise counseling/recommendations      Physical Exam  Vitals and nursing note reviewed.   Constitutional:       General: She is not in acute distress.     Appearance: She is well-developed.   HENT:      Head: Normocephalic.      Right Ear: Tympanic membrane normal.      Left Ear: Tympanic membrane normal.      Mouth/Throat:      Pharynx: Posterior oropharyngeal erythema present.   Eyes:      General: Lids are normal.      Conjunctiva/sclera: Conjunctivae normal.   Neck:      Thyroid: No thyroid mass or thyromegaly.      Trachea: Trachea normal.   Cardiovascular:      Rate and Rhythm: Normal rate and regular rhythm.      Heart sounds: Normal heart sounds.   Pulmonary:      Effort: Pulmonary effort is normal.      " Breath sounds: Rhonchi present.   Musculoskeletal:      Cervical back: Normal range of motion.   Lymphadenopathy:      Cervical: No cervical adenopathy.   Skin:     General: Skin is warm and dry.   Neurological:      Mental Status: She is alert and oriented to person, place, and time.   Psychiatric:         Attention and Perception: She is attentive.         Mood and Affect: Mood normal.         Speech: Speech normal.         Behavior: Behavior normal.        Result Review :  The following data was reviewed by: Lzu Perdomo MD on 09/17/2024:  Common labs          9/30/2024    07:26   Common Labs   Glucose 105    BUN 18    Creatinine 0.81    Sodium 137    Potassium 4.5    Chloride 102    Calcium 9.7    Albumin 4.4    Total Bilirubin 0.4    Alkaline Phosphatase 57    AST (SGOT) 28    ALT (SGPT) 30    Total Cholesterol 236    Triglycerides 230    HDL Cholesterol 72    LDL Cholesterol  124                Assessment and Plan   Diagnoses and all orders for this visit:    1. Acute URI (Primary)    Other orders  -     amoxicillin (AMOXIL) 500 MG capsule; Take 1 capsule by mouth 3 (Three) Times a Day.  Dispense: 30 capsule; Refill: 0  -     benzonatate (TESSALON) 200 MG capsule; Take 1 capsule by mouth 3 (Three) Times a Day As Needed for Cough. (Patient not taking: Reported on 9/24/2024)  Dispense: 30 capsule; Refill: 0             Follow Up   No follow-ups on file.  Patient was given instructions and counseling regarding her condition or for health maintenance advice. Please see specific information pulled into the AVS if appropriate.

## 2024-09-24 ENCOUNTER — OFFICE VISIT (OUTPATIENT)
Dept: FAMILY MEDICINE CLINIC | Facility: CLINIC | Age: 74
End: 2024-09-24
Payer: MEDICARE

## 2024-09-24 VITALS
HEART RATE: 77 BPM | OXYGEN SATURATION: 94 % | DIASTOLIC BLOOD PRESSURE: 85 MMHG | HEIGHT: 67 IN | WEIGHT: 187.4 LBS | SYSTOLIC BLOOD PRESSURE: 124 MMHG | BODY MASS INDEX: 29.41 KG/M2

## 2024-09-24 DIAGNOSIS — Z78.0 POSTMENOPAUSAL: ICD-10-CM

## 2024-09-24 DIAGNOSIS — Z00.00 MEDICARE ANNUAL WELLNESS VISIT, SUBSEQUENT: Primary | ICD-10-CM

## 2024-09-24 DIAGNOSIS — Z12.31 ENCOUNTER FOR SCREENING MAMMOGRAM FOR MALIGNANT NEOPLASM OF BREAST: ICD-10-CM

## 2024-09-24 DIAGNOSIS — E78.5 HYPERLIPIDEMIA, UNSPECIFIED HYPERLIPIDEMIA TYPE: ICD-10-CM

## 2024-09-24 DIAGNOSIS — E03.9 HYPOTHYROIDISM, UNSPECIFIED TYPE: ICD-10-CM

## 2024-09-24 DIAGNOSIS — R30.0 DYSURIA: ICD-10-CM

## 2024-09-24 NOTE — PROGRESS NOTES
Subjective   Africa Licona is a 73 y.o. female and is here for a comprehensive physical exam. The patient reports {problems:96443}.    Do you take any herbs or supplements that were not prescribed by a doctor? {yes/no/not asked:9010}  Are you taking calcium supplements? {yes/no:391382}  Are you taking aspirin daily? {yes/no:128741}    {Common ambulatory SmartLinks:09275}    Review of Systems  Do you have pain that bothers you in your daily life? {yes/no/not asked:9010}  {ros; complete:78904}    Objective   {exam; complete:72401}     No visits with results within 1 Week(s) from this visit.   Latest known visit with results is:   Office Visit on 07/10/2023   Component Date Value Ref Range Status    TSH 07/13/2023 0.206 (L)  0.270 - 4.200 uIU/mL Final    Glucose 07/13/2023 99  65 - 99 mg/dL Final    BUN 07/13/2023 18  8 - 23 mg/dL Final    Creatinine 07/13/2023 0.83  0.57 - 1.00 mg/dL Final    Sodium 07/13/2023 140  136 - 145 mmol/L Final    Potassium 07/13/2023 4.1  3.5 - 5.2 mmol/L Final    Chloride 07/13/2023 104  98 - 107 mmol/L Final    CO2 07/13/2023 23.3  22.0 - 29.0 mmol/L Final    Calcium 07/13/2023 10.0  8.6 - 10.5 mg/dL Final    Total Protein 07/13/2023 7.6  6.0 - 8.5 g/dL Final    Albumin 07/13/2023 4.6  3.5 - 5.2 g/dL Final    ALT (SGPT) 07/13/2023 39 (H)  1 - 33 U/L Final    AST (SGOT) 07/13/2023 30  1 - 32 U/L Final    Alkaline Phosphatase 07/13/2023 57  39 - 117 U/L Final    Total Bilirubin 07/13/2023 0.4  0.0 - 1.2 mg/dL Final    Globulin 07/13/2023 3.0  gm/dL Final    A/G Ratio 07/13/2023 1.5  g/dL Final    BUN/Creatinine Ratio 07/13/2023 21.7  7.0 - 25.0 Final    Anion Gap 07/13/2023 12.7  5.0 - 15.0 mmol/L Final    eGFR 07/13/2023 74.1  >60.0 mL/min/1.73 Final    Total Cholesterol 07/13/2023 229 (H)  0 - 200 mg/dL Final    Triglycerides 07/13/2023 99  0 - 150 mg/dL Final    HDL Cholesterol 07/13/2023 90 (H)  40 - 60 mg/dL Final    LDL Cholesterol  07/13/2023 122 (H)  0 - 100 mg/dL Final    VLDL  Cholesterol 07/13/2023 17  5 - 40 mg/dL Final    LDL/HDL Ratio 07/13/2023 1.32   Final       Assessment & Plan   Healthy female exam.   Diagnoses and all orders for this visit:    1. Medicare annual wellness visit, subsequent (Primary)    2. Hyperlipidemia, unspecified hyperlipidemia type  -     TSH  -     Comprehensive Metabolic Panel  -     Lipid Panel    3. Hypothyroidism, unspecified type  -     TSH    4. Encounter for screening mammogram for malignant neoplasm of breast  -     Mammo Screening Digital Tomosynthesis Bilateral With CAD; Future    5. Postmenopausal  -     DEXA Bone Density Axial    6. Dysuria  -     Urinalysis With Culture If Indicated - Urine, Clean Catch      1. Well exam.   2. Patient Counseling:  --Nutrition: Stressed importance of moderation in sodium/caffeine intake, saturated fat and cholesterol, caloric balance, sufficient intake of fresh fruits, vegetables, fiber, calcium, iron, and 1 mg of folate supplement per day (for females capable of pregnancy).  --Discussed the issue of estrogen replacement, calcium supplement, and the daily use of baby aspirin.  --Exercise: Stressed the importance of regular exercise.   --Substance Abuse: Discussed cessation/primary prevention of tobacco, alcohol, or other drug use; driving or other dangerous activities under the influence; availability of treatment for abuse.    --Sexuality: Discussed sexually transmitted diseases, partner selection, use of condoms, avoidance of unintended pregnancy  and contraceptive alternatives.   --Injury prevention: Discussed safety belts, safety helmets, smoke detector, smoking near bedding or upholstery.   --Dental health: Discussed importance of regular tooth brushing, flossing, and dental visits.  --Immunizations reviewed.  --Discussed benefits of screening colonoscopy.    3. Discussed the patient's BMI with her.  The BMI {BMI plan [facility]19504}  4. Follow up {follow-up interval:10834}

## 2024-09-30 ENCOUNTER — LAB (OUTPATIENT)
Dept: LAB | Facility: HOSPITAL | Age: 74
End: 2024-09-30
Payer: MEDICARE

## 2024-09-30 LAB
ALBUMIN SERPL-MCNC: 4.4 G/DL (ref 3.5–5.2)
ALBUMIN/GLOB SERPL: 1.6 G/DL
ALP SERPL-CCNC: 57 U/L (ref 39–117)
ALT SERPL W P-5'-P-CCNC: 30 U/L (ref 1–33)
ANION GAP SERPL CALCULATED.3IONS-SCNC: 7.6 MMOL/L (ref 5–15)
AST SERPL-CCNC: 28 U/L (ref 1–32)
BACTERIA UR QL AUTO: ABNORMAL /HPF
BILIRUB SERPL-MCNC: 0.4 MG/DL (ref 0–1.2)
BILIRUB UR QL STRIP: NEGATIVE
BUN SERPL-MCNC: 18 MG/DL (ref 8–23)
BUN/CREAT SERPL: 22.2 (ref 7–25)
CALCIUM SPEC-SCNC: 9.7 MG/DL (ref 8.6–10.5)
CHLORIDE SERPL-SCNC: 102 MMOL/L (ref 98–107)
CHOLEST SERPL-MCNC: 236 MG/DL (ref 0–200)
CLARITY UR: CLEAR
CO2 SERPL-SCNC: 27.4 MMOL/L (ref 22–29)
COLOR UR: YELLOW
CREAT SERPL-MCNC: 0.81 MG/DL (ref 0.57–1)
EGFRCR SERPLBLD CKD-EPI 2021: 75.8 ML/MIN/1.73
GLOBULIN UR ELPH-MCNC: 2.8 GM/DL
GLUCOSE SERPL-MCNC: 105 MG/DL (ref 65–99)
GLUCOSE UR STRIP-MCNC: NEGATIVE MG/DL
HDLC SERPL-MCNC: 72 MG/DL (ref 40–60)
HGB UR QL STRIP.AUTO: NEGATIVE
HOLD SPECIMEN: NORMAL
HYALINE CASTS UR QL AUTO: ABNORMAL /LPF
KETONES UR QL STRIP: NEGATIVE
LDLC SERPL CALC-MCNC: 124 MG/DL (ref 0–100)
LDLC/HDLC SERPL: 1.64 {RATIO}
LEUKOCYTE ESTERASE UR QL STRIP.AUTO: ABNORMAL
NITRITE UR QL STRIP: NEGATIVE
PH UR STRIP.AUTO: 6 [PH] (ref 5–8)
POTASSIUM SERPL-SCNC: 4.5 MMOL/L (ref 3.5–5.2)
PROT SERPL-MCNC: 7.2 G/DL (ref 6–8.5)
PROT UR QL STRIP: NEGATIVE
RBC # UR STRIP: ABNORMAL /HPF
REF LAB TEST METHOD: ABNORMAL
SODIUM SERPL-SCNC: 137 MMOL/L (ref 136–145)
SP GR UR STRIP: 1.02 (ref 1–1.03)
SQUAMOUS #/AREA URNS HPF: ABNORMAL /HPF
TRIGL SERPL-MCNC: 230 MG/DL (ref 0–150)
TSH SERPL DL<=0.05 MIU/L-ACNC: 4.24 UIU/ML (ref 0.27–4.2)
UROBILINOGEN UR QL STRIP: ABNORMAL
VLDLC SERPL-MCNC: 40 MG/DL (ref 5–40)
WBC # UR STRIP: ABNORMAL /HPF

## 2024-09-30 PROCEDURE — 81001 URINALYSIS AUTO W/SCOPE: CPT | Performed by: FAMILY MEDICINE

## 2024-09-30 PROCEDURE — 87086 URINE CULTURE/COLONY COUNT: CPT | Performed by: FAMILY MEDICINE

## 2024-09-30 PROCEDURE — 80061 LIPID PANEL: CPT | Performed by: FAMILY MEDICINE

## 2024-09-30 PROCEDURE — 80053 COMPREHEN METABOLIC PANEL: CPT | Performed by: FAMILY MEDICINE

## 2024-09-30 PROCEDURE — 36415 COLL VENOUS BLD VENIPUNCTURE: CPT | Performed by: FAMILY MEDICINE

## 2024-09-30 PROCEDURE — 84443 ASSAY THYROID STIM HORMONE: CPT | Performed by: FAMILY MEDICINE

## 2024-10-01 ENCOUNTER — PATIENT MESSAGE (OUTPATIENT)
Dept: FAMILY MEDICINE CLINIC | Facility: CLINIC | Age: 74
End: 2024-10-01
Payer: MEDICARE

## 2024-10-01 LAB — BACTERIA SPEC AEROBE CULT: NORMAL

## 2024-10-04 PROBLEM — J06.9 ACUTE URI: Status: ACTIVE | Noted: 2024-10-04

## 2024-10-13 NOTE — PROGRESS NOTES
Subjective   The ABCs of the Annual Wellness Visit  Medicare Wellness Visit      Africa Licona is a 73 y.o. patient who presents for a Medicare Wellness Visit.    The following portions of the patient's history were reviewed and   updated as appropriate: allergies, current medications, past family history, past medical history, past social history, past surgical history, and problem list.    Compared to one year ago, the patient's physical   health is the same.  Compared to one year ago, the patient's mental   health is the same.    Recent Hospitalizations:  She was not admitted to the hospital during the last year.     Current Medical Providers:  Patient Care Team:  Luz Perdomo MD as PCP - General  Sawyer, Keshawn Fletcher MD as Consulting Physician (Orthopedic Surgery)    Outpatient Medications Prior to Visit   Medication Sig Dispense Refill    amoxicillin (AMOXIL) 500 MG capsule Take 1 capsule by mouth 3 (Three) Times a Day. 30 capsule 0    Cholecalciferol (VITAMIN D3) 2000 units tablet Daily.      Cyanocobalamin (B-12) 1000 MCG tablet Daily.      FLUoxetine (PROzac) 20 MG capsule TAKE 1 CAPSULE BY MOUTH EVERY DAY 90 capsule 3    levothyroxine (SYNTHROID, LEVOTHROID) 150 MCG tablet TAKE 1 TABLET BY MOUTH DAILY 90 tablet 0    lisinopril (PRINIVIL,ZESTRIL) 10 MG tablet TAKE 1 TABLET BY MOUTH DAILY 90 tablet 0    omeprazole (priLOSEC) 20 MG capsule TAKE 1 CAPSULE BY MOUTH DAILY 90 capsule 1    rosuvastatin (CRESTOR) 20 MG tablet TAKE 1 TABLET BY MOUTH EVERY NIGHT AT BEDTIME 90 tablet 3    benzonatate (TESSALON) 200 MG capsule Take 1 capsule by mouth 3 (Three) Times a Day As Needed for Cough. (Patient not taking: Reported on 9/24/2024) 30 capsule 0    meclizine (ANTIVERT) 25 MG tablet Take 1 tablet by mouth 3 (Three) Times a Day As Needed for Dizziness. (Patient not taking: Reported on 9/17/2024) 30 tablet 0    meloxicam (MOBIC) 15 MG tablet TAKE 1 TABLET BY MOUTH DAILY 90 tablet 0     No facility-administered  "medications prior to visit.     No opioid medication identified on active medication list. I have reviewed chart for other potential  high risk medication/s and harmful drug interactions in the elderly.      Aspirin is not on active medication list.  Aspirin use is not indicated based on review of current medical condition/s. Risk of harm outweighs potential benefits.  .    Patient Active Problem List   Diagnosis    Hyperlipidemia    Hypertension    Hypothyroidism    Screening for breast cancer    Postmenopausal    Other screening mammogram    Depression    Esophageal stricture    Allergic contact dermatitis due to plants, except food    Encounter for subsequent annual wellness visit (AWV) in Medicare patient    Cluster headache, not intractable    Primary osteoarthritis of both knees    Hiatal hernia    Acute URI     Advance Care Planning Advance Directive is not on file.  ACP discussion was held with the patient during this visit. Patient does not have an advance directive, information provided.            Objective   Vitals:    09/24/24 1514   BP: 124/85   BP Location: Left arm   Patient Position: Sitting   Cuff Size: Large Adult   Pulse: 77   SpO2: 94%   Weight: 85 kg (187 lb 6.4 oz)   Height: 170.2 cm (67\")       Estimated body mass index is 29.35 kg/m² as calculated from the following:    Height as of this encounter: 170.2 cm (67\").    Weight as of this encounter: 85 kg (187 lb 6.4 oz).     Physical Exam  Vitals and nursing note reviewed.   Constitutional:       General: She is not in acute distress.     Appearance: She is well-developed.   HENT:      Head: Normocephalic.   Eyes:      General: Lids are normal.      Conjunctiva/sclera: Conjunctivae normal.   Neck:      Thyroid: No thyroid mass or thyromegaly.      Trachea: Trachea normal.   Cardiovascular:      Rate and Rhythm: Normal rate and regular rhythm.      Heart sounds: Normal heart sounds.   Pulmonary:      Effort: Pulmonary effort is normal.      " Breath sounds: Normal breath sounds.   Musculoskeletal:      Cervical back: Normal range of motion.      Right lower leg: No edema.      Left lower leg: No edema.   Lymphadenopathy:      Cervical: No cervical adenopathy.   Skin:     General: Skin is warm and dry.   Neurological:      Mental Status: She is alert and oriented to person, place, and time.   Psychiatric:         Attention and Perception: She is attentive.         Mood and Affect: Mood normal.         Speech: Speech normal.         Behavior: Behavior normal.              Does the patient have evidence of cognitive impairment? No  Lab Results   Component Value Date    TRIG 230 (H) 2024    HDL 72 (H) 2024     (H) 2024    VLDL 40 2024                                                                                               Health  Risk Assessment    Smoking Status:  Social History     Tobacco Use   Smoking Status Former    Current packs/day: 1.50    Average packs/day: 1.5 packs/day for 15.0 years (22.5 ttl pk-yrs)    Types: Cigarettes   Smokeless Tobacco Never   Tobacco Comments    quit 25 yrs ago     Alcohol Consumption:  Social History     Substance and Sexual Activity   Alcohol Use Not Currently    Alcohol/week: 14.0 standard drinks of alcohol    Types: 4 Glasses of wine, 10 Shots of liquor per week    Comment: Currently abstinent to try to lower liver numbers       Fall Risk Screen  STEADI Fall Risk Assessment was completed, and patient is at LOW risk for falls.Assessment completed on:2024    Depression Screenin/24/2024     3:15 PM   PHQ-2/PHQ-9 Depression Screening   Retired Little Interest or Pleasure in Doing Things 1-->several days   Retired Feeling Down, Depressed or Hopeless 1-->several days   Retired Trouble Falling or Staying Asleep, or Sleeping Too Much 0-->not at all   Retired Feeling Tired or Having Little Energy 1-->several days   Retired Poor Appetite or Overeating 0-->not at all   Retired  Feeling Bad about Yourself - or that You are a Failure or Have Let Yourself or Your Family Down 0-->not at all   Retired Trouble Concentrating on Things, Such as Reading the Newspaper or Watching Television 0-->not at all   Retired Moving or Speaking So Slowly that Other People Could Have Noticed? Or the Opposite - Being So Fidgety 0-->not at all   Retired Thoughts that You Would be Better Off Dead or of Hurting Yourself in Some Way 0-->not at all   Retired PHQ-9: Brief Depression Severity Measure Score 3   Retired If You Checked Off Any Problems, How Difficult Have These Problems Made It For You to Do Your Work, Take Care of Things at Home, or Get Along with Other People? not difficult at all     Health Habits and Functional and Cognitive Screenin/23/2024     4:30 PM   Functional & Cognitive Status   Do you have difficulty preparing food and eating? No    Do you have difficulty bathing yourself, getting dressed or grooming yourself? No    Do you have difficulty using the toilet? No    Do you have difficulty moving around from place to place? No    Do you have trouble with steps or getting out of a bed or a chair? No    Current Diet Well Balanced Diet    Dental Exam Up to date    Eye Exam Up to date    Exercise (times per week) 2 times per week    Current Exercises Include Aerobics    Do you need help using the phone?  No    Are you deaf or do you have serious difficulty hearing?  No    Do you need help to go to places out of walking distance? No    Do you need help shopping? No    Do you need help preparing meals?  No    Do you need help with housework?  No    Do you need help with laundry? No    Do you need help taking your medications? No    Do you need help managing money? No    Do you ever drive or ride in a car without wearing a seat belt? No    Have you felt unusual stress, anger or loneliness in the last month? No    Who do you live with? Spouse    If you need help, do you have trouble finding  someone available to you? No    Have you been bothered in the last four weeks by sexual problems? No    Do you have difficulty concentrating, remembering or making decisions? No        Patient-reported           Age-appropriate Screening Schedule:  Refer to the list below for future screening recommendations based on patient's age, sex and/or medical conditions. Orders for these recommended tests are listed in the plan section. The patient has been provided with a written plan.    Health Maintenance List  Health Maintenance   Topic Date Due    HEPATITIS C SCREENING  Never done    DXA SCAN  11/06/2022    RSV Vaccine - Adults (1 - 1-dose 75+ series) Never done    COVID-19 Vaccine (8 - 2023-24 season) 11/27/2024    ANNUAL WELLNESS VISIT  09/24/2025    BMI FOLLOWUP  09/24/2025    LIPID PANEL  09/30/2025    COLORECTAL CANCER SCREENING  12/03/2031    TDAP/TD VACCINES (2 - Td or Tdap) 10/02/2034    INFLUENZA VACCINE  Completed    Pneumococcal Vaccine 65+  Completed    ZOSTER VACCINE  Completed                                                                                                                                                CMS Preventative Services Quick Reference  Risk Factors Identified During Encounter  Inactivity/Sedentary: Patient was advised to exercise at least 150 minutes a week per CDC recommendations.  Dental Screening Recommended  Vision Screening Recommended    The above risks/problems have been discussed with the patient.  Pertinent information has been shared with the patient in the After Visit Summary.  An After Visit Summary and PPPS were made available to the patient.    Follow Up:   Next Medicare Wellness visit to be scheduled in 1 year.     Assessment & Plan  Medicare annual wellness visit, subsequent    Hyperlipidemia, unspecified hyperlipidemia type   Lipid abnormalities are stable    Plan:  Continue same medication/s without change.      Counseled patient on lifestyle modifications to help  control hyperlipidemia.     Patient Treatment Goals:   LDL goal is under 100    Followup in 6 months.  Hypothyroidism, unspecified type    Encounter for screening mammogram for malignant neoplasm of breast    Postmenopausal    Dysuria    Orders Placed This Encounter   Procedures    Urine Culture - Urine, Urine, Clean Catch    Mammo Screening Digital Tomosynthesis Bilateral With CAD    DEXA Bone Density Axial    TSH    Comprehensive Metabolic Panel    Lipid Panel    Urinalysis With Culture If Indicated - Urine, Clean Catch    [place] Urine Culture Tube - Urine, Clean Catch    Urinalysis, Microscopic Only - Urine, Clean Catch             Follow Up:   No follow-ups on file.

## 2024-10-20 ENCOUNTER — APPOINTMENT (OUTPATIENT)
Dept: GENERAL RADIOLOGY | Facility: HOSPITAL | Age: 74
End: 2024-10-20
Payer: MEDICARE

## 2024-10-20 ENCOUNTER — HOSPITAL ENCOUNTER (EMERGENCY)
Facility: HOSPITAL | Age: 74
Discharge: HOME | End: 2024-10-20
Admitting: EMERGENCY MEDICINE
Payer: MEDICARE

## 2024-10-20 VITALS
TEMPERATURE: 98 F | WEIGHT: 189.6 LBS | RESPIRATION RATE: 16 BRPM | SYSTOLIC BLOOD PRESSURE: 170 MMHG | BODY MASS INDEX: 29.76 KG/M2 | HEART RATE: 83 BPM | HEIGHT: 67 IN | DIASTOLIC BLOOD PRESSURE: 97 MMHG | OXYGEN SATURATION: 97 %

## 2024-10-20 DIAGNOSIS — S63.92XA HAND SPRAIN, LEFT, INITIAL ENCOUNTER: ICD-10-CM
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
## 2024-10-20 PROCEDURE — 99283 EMERGENCY DEPT VISIT LOW MDM: CPT

## 2024-10-20 PROCEDURE — 73130 X-RAY EXAM OF HAND: CPT

## 2024-10-20 NOTE — DISCHARGE INSTRUCTIONS
Wear splint until pain improves.  Elevate and ice over the next couple of days.  Tylenol for pain if needed per package instructions.  Follow-up with your primary care provider if no improvement.  Return for new or worsening symptoms.

## 2024-10-20 NOTE — ED PROVIDER NOTES
Subjective   History of Present Illness  Patient is a 74-year-old white female who presents today with complaints of injury to her left hand.  She states she got up early this morning to check on her chickens and stepped in a hole in her yard and fell try to catch herself with the left hand.  She complains of pain along the ulnar side of the left hand.  She denies any numbness tingling or weakness.  Denies any pain in the wrist forearm or elbow.  She denies any other injury or complaint.      Review of Systems   Musculoskeletal:         Pain in left hand       Past Medical History:   Diagnosis Date    Allergic     Arthritis     Cataract     Depression     Headache Spring 2021    HL (hearing loss) 2019    Hyperlipidemia     Hypertension     Hyperthyroidism     Hypothyroidism     Osteopenia        Allergies   Allergen Reactions    Other Unknown (See Comments)     Wasps      Cefazolin Itching       Past Surgical History:   Procedure Laterality Date    ADENOIDECTOMY  1953    COLONOSCOPY  02/2018    COSMETIC SURGERY  1965    ENDOMETRIAL ABLATION  1982    FRACTURE SURGERY  1965    JOINT REPLACEMENT Left 06/03/2020    knee    TONSILLECTOMY  1953    TUBAL ABDOMINAL LIGATION  1982       Family History   Problem Relation Age of Onset    Hypertension Other        Social History     Socioeconomic History    Marital status:    Tobacco Use    Smoking status: Former     Current packs/day: 1.50     Average packs/day: 1.5 packs/day for 15.0 years (22.5 ttl pk-yrs)     Types: Cigarettes    Smokeless tobacco: Never    Tobacco comments:     quit 25 yrs ago   Vaping Use    Vaping status: Never Used   Substance and Sexual Activity    Alcohol use: Not Currently     Alcohol/week: 14.0 standard drinks of alcohol     Types: 4 Glasses of wine, 10 Shots of liquor per week     Comment: Currently abstinent to try to lower liver numbers    Drug use: Never    Sexual activity: Yes     Partners: Female           Objective   Physical  Exam  Vital signs and triage nurse note reviewed.  Constitutional: Awake, alert; well-developed and well-nourished. No acute distress is noted.  Musculoskeletal: Independent range of motion of all extremities.  Mild tenderness over the left hand particularly over the fourth and fifth metacarpal.  There is no gross deformity.  No overlying erythema or ecchymosis.  No edema.  No open wounds.  There is good cap refill and sensation distally.  Skin: Flesh tone, warm, dry, intact; no erythematous or petechial rash or lesion.    Procedures           ED Course      Labs Reviewed - No data to display  XR Hand 3+ View Left    Result Date: 10/20/2024  Impression: 1. No acute fracture or dislocation of the left wrist. 2. Severe degenerative joint disease at the first carpometacarpal joint and moderate degenerative joint disease at the distal radial ulnar joint. Electronically Signed: Salas Triplett  10/20/2024 3:04 PM EDT  Workstation ID: [id number]   Medications - No data to display                                         Medical Decision Making  Patient presents today with the above complaint.    She had above exam evaluation.  X-rays of the left hand were obtained that were independently interpreted by the radiologist and reviewed by myself and show no fractures dislocations in the hand or wrist.  Severe degenerative changes in the hand.    The patient had a prefabricated Velcro wrist splint applied.  Distal motor, sensory and vascular status intact after application.     Findings were discussed with patient.  She will be discharged home instructed follow-up with primary care provider as needed.  She was given warning signs for prompt return to ED voiced understanding.    Problems Addressed:  Fall, initial encounter: complicated acute illness or injury  Hand sprain, left, initial encounter: complicated acute illness or injury    Amount and/or Complexity of Data Reviewed  Radiology: ordered.        Final diagnoses:   Fall,  initial encounter   Hand sprain, left, initial encounter       ED Disposition  ED Disposition       ED Disposition   Discharge    Condition   Stable    Comment   --               Luz Perdomo MD  [address] [address]  [phone]      As needed         Medication List      No changes were made to your prescriptions during this visit.            Tamia Rolon, APRN  10/20/24 1800

## 2024-10-22 DIAGNOSIS — E78.5 HYPERLIPIDEMIA, UNSPECIFIED HYPERLIPIDEMIA TYPE: ICD-10-CM

## 2024-10-22 RX ORDER — LEVOTHYROXINE SODIUM 150 UG/1
150 TABLET ORAL DAILY
Qty: 90 TABLET | Refills: 0 | Status: SHIPPED | OUTPATIENT
Start: 2024-10-22

## 2024-10-22 RX ORDER — ROSUVASTATIN CALCIUM 20 MG/1
20 TABLET, COATED ORAL
Qty: 90 TABLET | Refills: 3 | Status: SHIPPED | OUTPATIENT
Start: 2024-10-22

## 2024-10-22 RX ORDER — LISINOPRIL 10 MG/1
10 TABLET ORAL DAILY
Qty: 90 TABLET | Refills: 0 | Status: SHIPPED | OUTPATIENT
Start: 2024-10-22

## 2024-11-06 ENCOUNTER — PATIENT MESSAGE (OUTPATIENT)
Dept: FAMILY MEDICINE CLINIC | Facility: CLINIC | Age: 74
End: 2024-11-06
Payer: MEDICARE

## 2024-11-06 DIAGNOSIS — E78.5 HYPERLIPIDEMIA, UNSPECIFIED HYPERLIPIDEMIA TYPE: Primary | ICD-10-CM

## 2024-11-06 DIAGNOSIS — E03.9 HYPOTHYROIDISM, UNSPECIFIED TYPE: ICD-10-CM

## 2024-11-08 DIAGNOSIS — R92.8 ABNORMAL MAMMOGRAM: Primary | ICD-10-CM

## 2024-11-18 ENCOUNTER — LAB (OUTPATIENT)
Dept: LAB | Facility: HOSPITAL | Age: 74
End: 2024-11-18
Payer: MEDICARE

## 2024-11-18 LAB
ALBUMIN SERPL-MCNC: 4.4 G/DL (ref 3.5–5.2)
ALBUMIN/GLOB SERPL: 1.6 G/DL
ALP SERPL-CCNC: 62 U/L (ref 39–117)
ALT SERPL W P-5'-P-CCNC: 29 U/L (ref 1–33)
ANION GAP SERPL CALCULATED.3IONS-SCNC: 9.9 MMOL/L (ref 5–15)
AST SERPL-CCNC: 32 U/L (ref 1–32)
BILIRUB SERPL-MCNC: 0.3 MG/DL (ref 0–1.2)
BUN SERPL-MCNC: 17 MG/DL (ref 8–23)
BUN/CREAT SERPL: 21.5 (ref 7–25)
CALCIUM SPEC-SCNC: 9.1 MG/DL (ref 8.6–10.5)
CHLORIDE SERPL-SCNC: 100 MMOL/L (ref 98–107)
CHOLEST SERPL-MCNC: 212 MG/DL (ref 0–200)
CO2 SERPL-SCNC: 28.1 MMOL/L (ref 22–29)
CREAT SERPL-MCNC: 0.79 MG/DL (ref 0.57–1)
EGFRCR SERPLBLD CKD-EPI 2021: 77.6 ML/MIN/1.73
GLOBULIN UR ELPH-MCNC: 2.7 GM/DL
GLUCOSE SERPL-MCNC: 106 MG/DL (ref 65–99)
HDLC SERPL-MCNC: 97 MG/DL (ref 40–60)
LDLC SERPL CALC-MCNC: 102 MG/DL (ref 0–100)
LDLC/HDLC SERPL: 1.03 {RATIO}
POTASSIUM SERPL-SCNC: 4.1 MMOL/L (ref 3.5–5.2)
PROT SERPL-MCNC: 7.1 G/DL (ref 6–8.5)
SODIUM SERPL-SCNC: 138 MMOL/L (ref 136–145)
TRIGL SERPL-MCNC: 75 MG/DL (ref 0–150)
TSH SERPL DL<=0.05 MIU/L-ACNC: 6.17 UIU/ML (ref 0.27–4.2)
VLDLC SERPL-MCNC: 13 MG/DL (ref 5–40)

## 2024-11-18 PROCEDURE — 80053 COMPREHEN METABOLIC PANEL: CPT | Performed by: FAMILY MEDICINE

## 2024-11-18 PROCEDURE — 80061 LIPID PANEL: CPT | Performed by: FAMILY MEDICINE

## 2024-11-18 PROCEDURE — 84443 ASSAY THYROID STIM HORMONE: CPT | Performed by: FAMILY MEDICINE

## 2024-11-19 RX ORDER — LEVOTHYROXINE SODIUM 175 UG/1
175 TABLET ORAL DAILY
Qty: 90 TABLET | Refills: 1 | Status: SHIPPED | OUTPATIENT
Start: 2024-11-19

## 2025-01-20 RX ORDER — LISINOPRIL 10 MG/1
10 TABLET ORAL DAILY
Qty: 90 TABLET | Refills: 0 | Status: SHIPPED | OUTPATIENT
Start: 2025-01-20

## 2025-01-20 RX ORDER — LEVOTHYROXINE SODIUM 150 UG/1
150 TABLET ORAL DAILY
Qty: 90 TABLET | Refills: 0 | OUTPATIENT
Start: 2025-01-20

## 2025-01-20 NOTE — TELEPHONE ENCOUNTER
Rx Refill Note  Requested Prescriptions     Pending Prescriptions Disp Refills    levothyroxine (SYNTHROID, LEVOTHROID) 150 MCG tablet [Pharmacy Med Name: LEVOTHYROXINE 0.150MG (150MCG) TAB] 90 tablet 0     Sig: TAKE 1 TABLET BY MOUTH DAILY     Signed Prescriptions Disp Refills    lisinopril (PRINIVIL,ZESTRIL) 10 MG tablet 90 tablet 0     Sig: TAKE 1 TABLET BY MOUTH DAILY     Authorizing Provider: SAI CANDELARIO     Ordering User: ALISA PORTILLO      Last office visit with prescribing clinician: 9/24/2024   Last telemedicine visit with prescribing clinician: Visit date not found   Next office visit with prescribing clinician: Visit date not found                         Would you like a call back once the refill request has been completed: [] Yes [] No    If the office needs to give you a call back, can they leave a voicemail: [] Yes [] No    Alisa Portillo MA  01/20/25, 07:51 EST

## 2025-04-02 ENCOUNTER — LAB (OUTPATIENT)
Dept: FAMILY MEDICINE CLINIC | Facility: CLINIC | Age: 75
End: 2025-04-02
Payer: MEDICARE

## 2025-04-02 ENCOUNTER — OFFICE VISIT (OUTPATIENT)
Dept: FAMILY MEDICINE CLINIC | Facility: CLINIC | Age: 75
End: 2025-04-02
Payer: MEDICARE

## 2025-04-02 VITALS
HEIGHT: 67 IN | OXYGEN SATURATION: 94 % | BODY MASS INDEX: 29.7 KG/M2 | DIASTOLIC BLOOD PRESSURE: 84 MMHG | WEIGHT: 189.2 LBS | SYSTOLIC BLOOD PRESSURE: 138 MMHG | HEART RATE: 77 BPM | TEMPERATURE: 98.3 F

## 2025-04-02 DIAGNOSIS — E03.9 HYPOTHYROIDISM, UNSPECIFIED TYPE: Primary | ICD-10-CM

## 2025-04-02 DIAGNOSIS — R14.0 BLOATING SYMPTOM: ICD-10-CM

## 2025-04-02 LAB — TSH SERPL DL<=0.05 MIU/L-ACNC: 0.02 UIU/ML (ref 0.27–4.2)

## 2025-04-02 PROCEDURE — 36415 COLL VENOUS BLD VENIPUNCTURE: CPT | Performed by: FAMILY MEDICINE

## 2025-04-02 PROCEDURE — 84443 ASSAY THYROID STIM HORMONE: CPT | Performed by: FAMILY MEDICINE

## 2025-04-02 RX ORDER — METRONIDAZOLE 500 MG/1
500 TABLET ORAL 3 TIMES DAILY
Qty: 21 TABLET | Refills: 0 | Status: SHIPPED | OUTPATIENT
Start: 2025-04-02

## 2025-04-02 NOTE — PROGRESS NOTES
"Chief Complaint  Nausea, Bloated, and Gas    Subjective        [place] Licona presents to [facility]  Nausea  Symptoms are new.   Onset was in the past 7 days.   Symptoms occur intermittently.   Symptoms have been worse since onset.   Symptoms include nausea.    Pertinent negative symptoms include no cough, no diaphoresis, no fatigue, no fever, no neck pain, no numbness, no rash and no swollen glands.   Symptoms comment: no heartburn, feels bloated and gassy, diarrhea is less formed.   Gas  Symptoms include nausea.    Pertinent negative symptoms include no cough, no diaphoresis, no fatigue, no fever, no neck pain, no numbness, no rash and no swollen glands.   Symptoms comment: no heartburn, feels bloated and gassy, diarrhea is less formed.   Hypothyroidism  Presents for follow-up visit. Symptoms include diarrhea. Patient reports no anxiety, constipation, diaphoresis, fatigue, hoarse voice or leg swelling.       Objective   Vital Signs:  /84   Pulse 77   Temp 98.3 °F (36.8 °C) (Temporal)   Ht 170.2 cm (67\")   Wt 85.8 kg (189 lb 3.2 oz)   SpO2 94%   BMI 29.63 kg/m²   Estimated body mass index is 29.63 kg/m² as calculated from the following:    Height as of this encounter: 170.2 cm (67\").    Weight as of this encounter: 85.8 kg (189 lb 3.2 oz).            Physical Exam  Vitals and nursing note reviewed.   Constitutional:       General: She is not in acute distress.     Appearance: She is well-developed.   HENT:      Head: Normocephalic.   Eyes:      General: Lids are normal.      Conjunctiva/sclera: Conjunctivae normal.   Neck:      Thyroid: No thyroid mass or thyromegaly.      Trachea: Trachea normal.   Cardiovascular:      Rate and Rhythm: Normal rate and regular rhythm.      Heart sounds: Normal heart sounds.   Pulmonary:      Effort: Pulmonary effort is normal.      Breath sounds: Normal breath sounds.   Abdominal:      General: There is no distension.      Palpations: " Abdomen is soft.      Tenderness: There is abdominal tenderness in the epigastric area.   Musculoskeletal:      Cervical back: Normal range of motion.   Lymphadenopathy:      Cervical: No cervical adenopathy.   Skin:     General: Skin is warm and dry.   Neurological:      Mental Status: She is alert and oriented to person, place, and time.   Psychiatric:         Attention and Perception: She is attentive.         Mood and Affect: Mood normal.         Speech: Speech normal.         Behavior: Behavior normal.        Result Review :  The following data was reviewed by: Luz Perdomo MD on 04/02/2025:  Common labs          9/30/2024    07:26 11/18/2024    07:37   Common Labs   Glucose 105  106    BUN 18  17    Creatinine 0.81  0.79    Sodium 137  138    Potassium 4.5  4.1    Chloride 102  100    Calcium 9.7  9.1    Albumin 4.4  4.4    Total Bilirubin 0.4  0.3    Alkaline Phosphatase 57  62    AST (SGOT) 28  32    ALT (SGPT) 30  29    Total Cholesterol 236  212    Triglycerides 230  75    HDL Cholesterol 72  97    LDL Cholesterol  124  102                Assessment and Plan   Diagnoses and all orders for this visit:    1. Hypothyroidism, unspecified type (Primary)  -     TSH    2. Bloating symptom    Other orders  -     metroNIDAZOLE (Flagyl) 500 MG tablet; Take 1 tablet by mouth 3 (Three) Times a Day.  Dispense: 21 tablet; Refill: 0             Follow Up   No follow-ups on file.  Patient was given instructions and counseling regarding her condition or for health maintenance advice. Please see specific information pulled into the AVS if appropriate.

## 2025-04-10 PROCEDURE — 87507 IADNA-DNA/RNA PROBE TQ 12-25: CPT

## 2025-04-10 PROCEDURE — 87338 HPYLORI STOOL AG IA: CPT

## 2025-04-11 ENCOUNTER — LAB (OUTPATIENT)
Dept: LAB | Facility: HOSPITAL | Age: 75
End: 2025-04-11
Payer: MEDICARE

## 2025-04-11 DIAGNOSIS — D64.89 ANEMIA DUE TO OTHER CAUSE, NOT CLASSIFIED: ICD-10-CM

## 2025-04-11 DIAGNOSIS — R19.7 DIARRHEA OF PRESUMED INFECTIOUS ORIGIN: ICD-10-CM

## 2025-04-11 DIAGNOSIS — R14.0 BLOATING SYMPTOM: ICD-10-CM

## 2025-04-11 LAB
ADV 40+41 DNA STL QL NAA+NON-PROBE: NOT DETECTED
ASTRO TYP 1-8 RNA STL QL NAA+NON-PROBE: NOT DETECTED
C CAYETANENSIS DNA STL QL NAA+NON-PROBE: NOT DETECTED
C COLI+JEJ+UPSA DNA STL QL NAA+NON-PROBE: NOT DETECTED
CRYPTOSP DNA STL QL NAA+NON-PROBE: NOT DETECTED
E HISTOLYT DNA STL QL NAA+NON-PROBE: NOT DETECTED
EAEC PAA PLAS AGGR+AATA ST NAA+NON-PRB: NOT DETECTED
EC STX1+STX2 GENES STL QL NAA+NON-PROBE: NOT DETECTED
EPEC EAE GENE STL QL NAA+NON-PROBE: DETECTED
ETEC LTA+ST1A+ST1B TOX ST NAA+NON-PROBE: NOT DETECTED
G LAMBLIA DNA STL QL NAA+NON-PROBE: NOT DETECTED
NOROVIRUS GI+II RNA STL QL NAA+NON-PROBE: NOT DETECTED
P SHIGELLOIDES DNA STL QL NAA+NON-PROBE: NOT DETECTED
RVA RNA STL QL NAA+NON-PROBE: NOT DETECTED
S ENT+BONG DNA STL QL NAA+NON-PROBE: NOT DETECTED
SAPO I+II+IV+V RNA STL QL NAA+NON-PROBE: NOT DETECTED
SHIGELLA SP+EIEC IPAH ST NAA+NON-PROBE: NOT DETECTED
V CHOL+PARA+VUL DNA STL QL NAA+NON-PROBE: NOT DETECTED
V CHOLERAE DNA STL QL NAA+NON-PROBE: NOT DETECTED
Y ENTEROCOL DNA STL QL NAA+NON-PROBE: NOT DETECTED

## 2025-04-11 PROCEDURE — 87338 HPYLORI STOOL AG IA: CPT

## 2025-04-11 PROCEDURE — 87507 IADNA-DNA/RNA PROBE TQ 12-25: CPT

## 2025-04-11 RX ORDER — CIPROFLOXACIN 500 MG/1
500 TABLET, FILM COATED ORAL 2 TIMES DAILY
Qty: 14 TABLET | Refills: 0 | Status: SHIPPED | OUTPATIENT
Start: 2025-04-11

## 2025-04-12 LAB — H PYLORI AG STL QL IA: NEGATIVE

## 2025-04-18 RX ORDER — LISINOPRIL 10 MG/1
10 TABLET ORAL DAILY
Qty: 90 TABLET | Refills: 3 | Status: SHIPPED | OUTPATIENT
Start: 2025-04-18

## 2025-04-18 RX ORDER — OMEPRAZOLE 20 MG/1
20 CAPSULE, DELAYED RELEASE ORAL DAILY
Qty: 90 CAPSULE | Refills: 3 | Status: SHIPPED | OUTPATIENT
Start: 2025-04-18

## 2025-04-23 ENCOUNTER — TELEPHONE (OUTPATIENT)
Dept: FAMILY MEDICINE CLINIC | Facility: CLINIC | Age: 75
End: 2025-04-23
Payer: MEDICARE

## 2025-04-23 NOTE — TELEPHONE ENCOUNTER
I do not know of any orders that I have placed that would be addressed by [facility].

## 2025-04-23 NOTE — TELEPHONE ENCOUNTER
[facility] called to speak with Dr Perdomo but the call was cutting in and out and was not clear.

## 2025-05-05 ENCOUNTER — PATIENT MESSAGE (OUTPATIENT)
Dept: FAMILY MEDICINE CLINIC | Facility: CLINIC | Age: 75
End: 2025-05-05
Payer: MEDICARE

## 2025-05-05 DIAGNOSIS — R26.89 BALANCE DISORDER: Primary | ICD-10-CM
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
## 2025-05-19 DIAGNOSIS — F41.9 ANXIETY: Primary | ICD-10-CM

## 2025-05-19 RX ORDER — LORAZEPAM 0.5 MG/1
TABLET ORAL
Qty: 1 TABLET | Refills: 0 | Status: SHIPPED | OUTPATIENT
Start: 2025-05-19

## 2025-05-20 ENCOUNTER — HOSPITAL ENCOUNTER (OUTPATIENT)
Dept: MRI IMAGING | Facility: HOSPITAL | Age: 75
Discharge: HOME | End: 2025-05-20
Admitting: FAMILY MEDICINE
Payer: MEDICARE

## 2025-05-20 DIAGNOSIS — R26.89 BALANCE DISORDER: ICD-10-CM
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
## 2025-05-20 PROCEDURE — 70553 MRI BRAIN STEM W/O & W/DYE: CPT

## 2025-05-20 PROCEDURE — A9579 GAD-BASE MR CONTRAST NOS,1ML: HCPCS | Performed by: FAMILY MEDICINE

## 2025-05-20 PROCEDURE — 25010000002 GADOTERIDOL PER 1 ML: Performed by: FAMILY MEDICINE

## 2025-05-20 RX ADMIN — GADOTERIDOL 17 ML: 279.3 INJECTION, SOLUTION INTRAVENOUS at 08:27

## 2025-06-03 ENCOUNTER — OFFICE VISIT (OUTPATIENT)
Dept: FAMILY MEDICINE CLINIC | Facility: CLINIC | Age: 75
End: 2025-06-03
Payer: MEDICARE

## 2025-06-03 VITALS
DIASTOLIC BLOOD PRESSURE: 89 MMHG | HEART RATE: 71 BPM | SYSTOLIC BLOOD PRESSURE: 143 MMHG | TEMPERATURE: 99 F | WEIGHT: 191.2 LBS | OXYGEN SATURATION: 95 % | HEIGHT: 67 IN | BODY MASS INDEX: 30.01 KG/M2

## 2025-06-03 DIAGNOSIS — E03.9 HYPOTHYROIDISM, UNSPECIFIED TYPE: ICD-10-CM

## 2025-06-03 DIAGNOSIS — M54.12 CERVICAL RADICULOPATHY: Primary | ICD-10-CM

## 2025-06-03 LAB — TSH SERPL DL<=0.05 MIU/L-ACNC: 0.16 UIU/ML (ref 0.27–4.2)

## 2025-06-03 PROCEDURE — 36415 COLL VENOUS BLD VENIPUNCTURE: CPT | Performed by: FAMILY MEDICINE

## 2025-06-03 PROCEDURE — 84443 ASSAY THYROID STIM HORMONE: CPT | Performed by: FAMILY MEDICINE

## 2025-06-03 RX ORDER — FLUOROMETHOLONE 1 MG/ML
SUSPENSION/ DROPS OPHTHALMIC
COMMUNITY
Start: 2025-04-10 | End: 2025-06-22

## 2025-06-03 NOTE — PROGRESS NOTES
"Chief Complaint  Dizziness, Headache, and Fall (May 2)    Subjective        Af[place] Licona presents to [facility]  History of Present Illness  She had a fall in her chicken coop gathering eggs about a month ago.  She slipped and fell and cut her scalp.  Then 2 days later she was in her kitchen making supper and fell striking her chin on the kitchen counter.  H/O intermittent lightheaded feelings for months but she was not feeling any symptoms before her falls.  No one saw her fall.  She does not know if she had LOC. She has been seeing her chiropractor who did some cervical spine x-rays that reportedly showed some changes of possible ligament injury.   Dizziness  Symptoms are new.   Onset was 1 to 6 months.   Symptoms occur intermittently.   Symptoms include headaches, myalgias, neck pain and vertigo.    Pertinent negative symptoms include no weakness.   Headache    Associated symptoms: dizziness, headaches and myalgias      Associated symptoms: no weakness    Fall  Associated symptoms include headaches.     Follow up after falls  Symptoms are: new.   Onset was 1 to 6 months.   Symptoms occur: daily.  Symptoms include: headaches, myalgias, neck pain and vertigo.   Pertinent negative symptoms include no weakness.   Treatment and/or Medications comments include: Tylenol; ice       Objective   Vital Signs:  /89 (BP Location: Left arm, Patient Position: Sitting, Cuff Size: Adult)   Pulse 71   Temp 99 °F (37.2 °C) (Temporal)   Ht 170.2 cm (67\")   Wt 86.7 kg (191 lb 3.2 oz)   SpO2 95%   BMI 29.95 kg/m²   Estimated body mass index is 29.95 kg/m² as calculated from the following:    Height as of this encounter: 170.2 cm (67\").    Weight as of this encounter: 86.7 kg (191 lb 3.2 oz).            Physical Exam  Constitutional:       General: She is not in acute distress.     Appearance: She is well-developed.   HENT:      Head: Normocephalic.   Eyes:      General: Lids are normal.     "  Conjunctiva/sclera: Conjunctivae normal.   Neck:      Thyroid: No thyroid mass or thyromegaly.      Trachea: Trachea normal.   Cardiovascular:      Rate and Rhythm: Normal rate and regular rhythm.      Heart sounds: Normal heart sounds.   Pulmonary:      Effort: Pulmonary effort is normal.      Breath sounds: Normal breath sounds.   Abdominal:      Palpations: Abdomen is soft.   Musculoskeletal:      Cervical back: Normal range of motion.   Lymphadenopathy:      Cervical: No cervical adenopathy.   Skin:     General: Skin is warm and dry.   Neurological:      Mental Status: She is alert and oriented to person, place, and time.   Psychiatric:         Attention and Perception: She is attentive.         Mood and Affect: Mood normal.         Speech: Speech normal.         Behavior: Behavior normal.        Result Review :  The following data was reviewed by: Luz Perdomo MD on 06/03/2025:  Common labs          9/30/2024    07:26 11/18/2024    07:37   Common Labs   Glucose 105  106    BUN 18  17    Creatinine 0.81  0.79    Sodium 137  138    Potassium 4.5  4.1    Chloride 102  100    Calcium 9.7  9.1    Albumin 4.4  4.4    Total Bilirubin 0.4  0.3    Alkaline Phosphatase 57  62    AST (SGOT) 28  32    ALT (SGPT) 30  29    Total Cholesterol 236  212    Triglycerides 230  75    HDL Cholesterol 72  97    LDL Cholesterol  124  102                Assessment and Plan   Diagnoses and all orders for this visit:    1. Cervical radiculopathy (Primary)  -     MRI Cervical Spine Without Contrast; Future    2. Hypothyroidism, unspecified type  -     TSH             Follow Up   No follow-ups on file.  Patient was given instructions and counseling regarding her condition or for health maintenance advice. Please see specific information pulled into the AVS if appropriate.

## 2025-06-07 ENCOUNTER — APPOINTMENT (OUTPATIENT)
Dept: CT IMAGING | Facility: HOSPITAL | Age: 75
End: 2025-06-07
Payer: MEDICARE

## 2025-06-07 ENCOUNTER — HOSPITAL ENCOUNTER (EMERGENCY)
Facility: HOSPITAL | Age: 75
Discharge: HOME | End: 2025-06-07
Payer: MEDICARE

## 2025-06-07 VITALS
RESPIRATION RATE: 18 BRPM | HEART RATE: 70 BPM | BODY MASS INDEX: 29.9 KG/M2 | OXYGEN SATURATION: 94 % | DIASTOLIC BLOOD PRESSURE: 86 MMHG | HEIGHT: 67 IN | SYSTOLIC BLOOD PRESSURE: 140 MMHG | TEMPERATURE: 98 F | WEIGHT: 190.48 LBS

## 2025-06-07 DIAGNOSIS — M54.2 NECK PAIN: Primary | ICD-10-CM

## 2025-06-07 PROCEDURE — 72125 CT NECK SPINE W/O DYE: CPT

## 2025-06-07 PROCEDURE — 99284 EMERGENCY DEPT VISIT MOD MDM: CPT

## 2025-06-07 RX ORDER — HYDROCODONE BITARTRATE AND ACETAMINOPHEN 5; 325 MG/1; MG/1
1 TABLET ORAL ONCE
Refills: 0 | Status: COMPLETED | OUTPATIENT
Start: 2025-06-07 | End: 2025-06-07

## 2025-06-07 RX ORDER — HYDROCODONE BITARTRATE AND ACETAMINOPHEN 5; 325 MG/1; MG/1
1 TABLET ORAL EVERY 8 HOURS PRN
Qty: 9 TABLET | Refills: 0 | Status: SHIPPED | OUTPATIENT
Start: 2025-06-07

## 2025-06-07 RX ADMIN — HYDROCODONE BITARTRATE AND ACETAMINOPHEN 1 TABLET: 5; 325 TABLET ORAL at 16:28

## 2025-06-07 NOTE — DISCHARGE INSTRUCTIONS
Rest.  Drink plenty of fluids and increase your fiber intake while taking pain medication.  Take pain medications as prescribed.  No driving or operating heavy machinery while taking pain medication.  Follow-up with your primary care provider, call Monday to schedule an appointment.  Return to the ER for any new or worsening symptoms.

## 2025-06-07 NOTE — ED PROVIDER NOTES
Subjective   History of Present Illness  Chief complaint: Neck pain for the past 2 to 3 weeks after a fall 1 month ago.      Context: Patient is a 74-year-old female with a history of hypertension, hyperlipidemia and GERD presents to the ER with complaints of worsening neck pain for the past 2 to 3 weeks after a fall 1 month ago.  Patient reports she had an unknown fall with loss of consciousness in her kitchen 1 month ago, states she was seen at the time of the fall.  Patient reports she saw her primary care provider and an MRI of the brain with and without contrast was ordered.  Patient reports that she has since been back to her primary care with the neck pain, and an MRI of the cervical spine was ordered but is not going to be completed until 6/24.  Patient reports she has been having worsening pain for the past 2 to 3 weeks mild associated dizziness.  Patient denies any unilateral weakness, visual disturbances, nausea vomiting, syncopal episodes, chest pain, shortness of air, fever, abdominal pain nausea vomiting diarrhea or urinary symptoms.    PCP: Luz Perdomo    LMP: Postmenopausal          Review of Systems   Constitutional:  Negative for fever.       Past Medical History:   Diagnosis Date    Allergic     Arthritis     Cataract     Depression     GERD (gastroesophageal reflux disease)     Headache Spring 2021    HL (hearing loss) 2019    Hyperlipidemia     Hypertension     Hyperthyroidism     Hypothyroidism     Osteopenia     Sleep apnea 2016       Allergies   Allergen Reactions    Other Unknown (See Comments)     Wasps      Cefazolin Itching       Past Surgical History:   Procedure Laterality Date    ADENOIDECTOMY  1953    COLONOSCOPY  02/2018    COSMETIC SURGERY  1965    ENDOMETRIAL ABLATION  1982    EYE SURGERY  2012    Cataracts    FRACTURE SURGERY  1965    JOINT REPLACEMENT Left 06/03/2020    knee    TONSILLECTOMY  1953    TUBAL ABDOMINAL LIGATION  1982       Family History   Problem Relation Age of  Onset    Hypertension Other     Arthritis Mother     Hyperlipidemia Mother     Stroke Mother     Vision loss Mother         Glaucoma    Heart failure Mother     COPD Father        Social History     Socioeconomic History    Marital status:    Tobacco Use    Smoking status: Former     Current packs/day: 0.00     Average packs/day: 1.5 packs/day for 36.0 years (54.0 ttl pk-yrs)     Types: Cigarettes     Start date: 1973     Quit date: 1994     Years since quittin.4    Smokeless tobacco: Never    Tobacco comments:     quit 25 yrs ago   Vaping Use    Vaping status: Never Used   Substance and Sexual Activity    Alcohol use: Not Currently     Alcohol/week: 9.0 standard drinks of alcohol     Types: 9 Shots of liquor per week     Comment: Currently abstinent to try to lower liver numbers    Drug use: Never    Sexual activity: Not Currently     Partners: Female     Birth control/protection: Post-menopausal, Partner of same sex           Objective   Physical Exam  Vitals and nursing note reviewed.   Constitutional:       Appearance: Normal appearance.   HENT:      Head: Normocephalic and atraumatic.      Right Ear: Tympanic membrane normal.      Left Ear: Tympanic membrane normal.      Nose: Nose normal.      Mouth/Throat:      Mouth: Mucous membranes are moist.   Eyes:      Extraocular Movements: Extraocular movements intact.      Pupils: Pupils are equal, round, and reactive to light.   Cardiovascular:      Rate and Rhythm: Normal rate and regular rhythm.      Pulses: Normal pulses.      Heart sounds: Normal heart sounds.   Pulmonary:      Effort: Pulmonary effort is normal.      Breath sounds: Normal breath sounds. No wheezing.   Abdominal:      Palpations: Abdomen is soft.      Tenderness: There is no abdominal tenderness.   Musculoskeletal:         General: Tenderness present. Normal range of motion.      Cervical back: Normal range of motion and neck supple.      Comments: Midline tenderness to  "cervical spine and right parous spinal cervical spinal area on palpation, no crepitus or step-off noted.    No tenderness noted to jaw, pops or clicks noted.   Skin:     General: Skin is warm and dry.      Capillary Refill: Capillary refill takes less than 2 seconds.   Neurological:      General: No focal deficit present.      Mental Status: She is alert and oriented to person, place, and time.   Psychiatric:         Mood and Affect: Mood normal.         Behavior: Behavior normal.         Procedures           ED Course           /86   Pulse 70   Temp 98 °F (36.7 °C) (Oral)   Resp 18   Ht 170.2 cm (67\")   Wt 86.4 kg (190 lb 7.6 oz)   SpO2 94%   BMI 29.83 kg/m²   Labs Reviewed - No data to display  Medications   HYDROcodone-acetaminophen (NORCO) 5-325 MG per tablet 1 tablet (1 tablet Oral Given 6/7/25 1628)     CT Cervical Spine Without Contrast  Result Date: 6/7/2025  Impression: No acute fracture or traumatic malalignment of the cervical spine. Multilevel spondylosis is present, most notable at C4-5 and C5-6. Electronically Signed: Porfirio Dozier MD  6/7/2025 5:12 PM EDT  Workstation ID: [id number]                                                Medical Decision Making  Chart review: MRI Brain with and without  Dated 05/20/25  Impression:  1.No acute intracranial process identified.  2.Findings suggestive of mild chronic small vessel ischemic disease.  3.Mild paranasal sinus mucosal disease.       Radiology interpretation: CT cervical spine reviewed and interpreted by Campbell: No acute fracture or traumatic malalignment of the cervical spine.  Multilevel spondylosis is present, most notable at C4-5 and C5-6.  Further interpretation by radiologist as above    Labs considered but were not emergently warranted at this time.    EKG was considered but was not emergently warranted at this time.    Scans were obtained to evaluate for fractures, subluxations, stenosis, this is not an all-inclusive list.  Patient is " a 74-year-old female with a history of hyperlipidemia, hypertension and GERD who presents to the ER for above complaint.  On initial examination patient was resting comfortably in the bed, nontoxic in appearance with no signs and symptoms of distress.  Physical examination revealed lungs midline tenderness with the right sided paraspinal tenderness to cervical spine, no crepitus or step-off noted.  No tenderness noted to midline thoracic or lumbar spine, no neurofocal deficits noted, no unilateral weakness, pupils PERRLA, no tenderness to jaw or clicks or pops noted, lungs clear bilaterally auscultation, no tenderness noted to abdomen on palpation.  Patient reports that she does have an MRI of her cervical spine ordered for 6/24 but states that the pain has been worsening.  CT of the cervical spine was ordered and patient was given p.o. [place].  On reexamination patient was resting comfortably in the bed, nontoxic in appearance with no signs and symptoms of distress stating the pain was resolved.  Patient has been hemodynamically stable and well-appearing during this ER visit.  Inspect was ran.  Lorazepam 0.5 mg quantity 1, days 1 filled on 5/19/2025.  Oxycodone acetaminophen 7.5-3 25, quantity 60, days 10 filled on 10/12/2023, 9/12/2023, 8/24/2023.  Patient was prescribed Norco's.  Advised patient to rest, drink plenty of fluids, take medications as prescribed, do not drive or operate heavy machinery while taking pain medications.  Advised patient to follow-up with her primary care and to call on Monday to schedule an appointment for follow-up.  Advised to return to the ER for any new or worsening symptoms.  Patient verbalized understanding of all discharge instructions.    Appropriate PPE worn during exam.  Discussed care with: Dr. Hightower.    i discussed findings with patient who voices understanding of discharge instructions, signs and symptoms requiring return to ED; discharged improved and in stable condition  with follow up for re-evaluation.  This document is intended for medical expert use only. Reading of this document by patients and/or patient's family without participating medical staff guidance may result in misinterpretation and unintended morbidity.  Any interpretation of such data is the responsibility of the patient and/or family member responsible for the patient in concert with their primary or specialist providers, not to be left for sources of online searches such as [employer], [employer] or similar queries. Relying on these approaches to knowledge may result in misinterpretation, misguided goals of care and even death should patients or family members try recommendations outside of the realm of professional medical care in a supervised inpatient environment.         Problems Addressed:  Neck pain: complicated acute illness or injury    Amount and/or Complexity of Data Reviewed  Radiology: ordered.    Risk  Prescription drug management.        Final diagnoses:   Neck pain       ED Disposition  ED Disposition       ED Disposition   Discharge    Condition   Stable    Comment   --               Luz Perdomo MD  [address] [place] [address] [phone]    Schedule an appointment as soon as possible for a visit   Call Monday to schedule an appointment with primary care.         Medication List        New Prescriptions      HYDROcodone-acetaminophen 5-325 MG per tablet  Commonly known as: NORCO  Take 1 tablet by mouth Every 8 (Eight) Hours As Needed for Moderate Pain.               Where to Get Your Medications        These medications were sent to [id number] - [place], [place] - [address] AT [facility] [facility] & [address] - [phone]  - [phone] FX  [address] [place] [address]      Phone: [phone]   HYDROcodone-acetaminophen 5-325 MG per tablet            Dee Mo, APRTHANH  06/07/25 6957

## 2025-06-07 NOTE — ED NOTES
"Patient reports falling 1 month ago and hitting head. Patient said she had a MRI of her head approximately 2 weeks ago and reports that it was \"unremarkable\". Patient states pain is at base of skull, starting 10 days ago. Patient states she has been taking Tylenol regularly, and reports she took 1 gram of Tylenol at 1200. Patient states her PCP ordered a MRI of her neck but she is unable to be scanned until June 24th.  " No complaints

## 2025-06-08 ENCOUNTER — PATIENT MESSAGE (OUTPATIENT)
Dept: FAMILY MEDICINE CLINIC | Facility: CLINIC | Age: 75
End: 2025-06-08
Payer: MEDICARE

## 2025-06-09 ENCOUNTER — PATIENT MESSAGE (OUTPATIENT)
Dept: FAMILY MEDICINE CLINIC | Facility: CLINIC | Age: 75
End: 2025-06-09
Payer: MEDICARE

## 2025-06-09 RX ORDER — MECLIZINE HYDROCHLORIDE 25 MG/1
25 TABLET ORAL 3 TIMES DAILY PRN
Qty: 30 TABLET | Refills: 1 | Status: SHIPPED | OUTPATIENT
Start: 2025-06-09

## 2025-06-11 ENCOUNTER — PATIENT OUTREACH (OUTPATIENT)
Dept: CASE MANAGEMENT | Facility: OTHER | Age: 75
End: 2025-06-11
Payer: MEDICARE

## 2025-06-11 NOTE — OUTREACH NOTE
"AMBULATORY CASE MANAGEMENT NOTE    Names and Relationships of Patient/Support Persons: Contact: Africa Licona \"Africa Landaverde\"; Relationship: Self -     Patient Outreach    Pt discharged from [facility] ED on 6/7/25, seen for neck pain. RN-ACM outreach call made to pt, spoke briefly. Explained role of RN-ACM and reason for call. Reviewed ED AVS with pt. Education provided. Pt reports she has followed up with her PCP. No questions or needs per pt, advised her to call with any. Follow up outreach prn.    Send Education  Questions/Answers      Flowsheet Row Most Recent Value   Annual Wellness Visit:  Patient Has Completed   Other Patient Education/Resources  24/7 [facility] [place] Nurse Call Line   24/7 Nurse Call Line Education Method Verbal   Advanced Directives: --  [resources provided]          SDOH updated and reviewed with the patient during this program:  Questionnaire sent via [facility] [place]  Ambulatory Case Management    6/11/2025, 10:09 EDT  "

## 2025-06-22 PROBLEM — M54.12 CERVICAL RADICULOPATHY: Status: ACTIVE | Noted: 2025-06-22

## 2025-06-24 ENCOUNTER — OFFICE VISIT (OUTPATIENT)
Dept: CARDIOLOGY | Facility: CLINIC | Age: 75
End: 2025-06-24
Payer: MEDICARE

## 2025-06-24 VITALS
OXYGEN SATURATION: 96 % | SYSTOLIC BLOOD PRESSURE: 147 MMHG | RESPIRATION RATE: 16 BRPM | BODY MASS INDEX: 29.82 KG/M2 | HEIGHT: 67 IN | HEART RATE: 79 BPM | WEIGHT: 190 LBS | DIASTOLIC BLOOD PRESSURE: 88 MMHG

## 2025-06-24 DIAGNOSIS — E78.2 MIXED HYPERLIPIDEMIA: ICD-10-CM

## 2025-06-24 DIAGNOSIS — R55 SYNCOPE AND COLLAPSE: ICD-10-CM

## 2025-06-24 DIAGNOSIS — I20.89 SYNCOPE ANGINOSA: ICD-10-CM

## 2025-06-24 DIAGNOSIS — I10 PRIMARY HYPERTENSION: Primary | ICD-10-CM

## 2025-06-24 PROCEDURE — 3077F SYST BP >= 140 MM HG: CPT | Performed by: INTERNAL MEDICINE

## 2025-06-24 PROCEDURE — 93000 ELECTROCARDIOGRAM COMPLETE: CPT | Performed by: INTERNAL MEDICINE

## 2025-06-24 PROCEDURE — 1160F RVW MEDS BY RX/DR IN RCRD: CPT | Performed by: INTERNAL MEDICINE

## 2025-06-24 PROCEDURE — 99204 OFFICE O/P NEW MOD 45 MIN: CPT | Performed by: INTERNAL MEDICINE

## 2025-06-24 PROCEDURE — 3079F DIAST BP 80-89 MM HG: CPT | Performed by: INTERNAL MEDICINE

## 2025-06-24 PROCEDURE — 1159F MED LIST DOCD IN RCRD: CPT | Performed by: INTERNAL MEDICINE

## 2025-06-24 NOTE — PROGRESS NOTES
Date of Office Visit: 2025  Encounter Provider: Dr. Eleazar Nieto  Place of Service: [facility] CARDIOLOGY [place]  Patient Name: Africa Licona  :1950  Luz Perdomo MD    Chief Complaint   Patient presents with    Hypertension    Hyperlipidemia    Consult     History of Present Illness:    I am pleased to see Mrs. Licona in my office today as a new consultation.    As you know, patient is a 74-year-old white female whose past medical history is significant for hypertension, hyperlipidemia, hypothyroidism, who is referred to me for symptom of syncope.    In May 2025, patient reports that she had 2 episode of syncope.  Latest episode was about a month ago when she was at her home standing and trying to fix dinner.  She did not have any prodromal symptom and she passed out and fell down on the ground.  Patient did not have involuntary incontinence of stool or urine.  Patient injured her chin.  Patient had another episode few weeks prior to this episode in which she was extremely dizzy and lightheaded and hit her head to the wall but did not pass out.  Patient does complain of occasional dizzy spell.  Patient denies any chest pain.  No orthopnea PND.  She does admit to palpitation especially at nighttime.  It is described as a racing of the heart.    Patient does not have previous history of CAD, PCI or MI.  She does not smoke or abuse alcohol.    EKG showed normal sinus rhythm with heart rate of 79 bpm.  Nonspecific ST changes noted    I would recommend that patient should proceed with stress test and echocardiogram.  I would also recommend to proceed with tilt table test and subsequently loop recorder should be placed.  I am very concerned about possibility of intermittent bradycardia as a cause of her symptoms.  However I have advised the patient to discuss with PCP for neurological workup including EEG.  She recently had MRI which was unremarkable except for old age-related changes.        Past  Medical History:   Diagnosis Date    Allergic     Arthritis     Cataract     Depression     GERD (gastroesophageal reflux disease)     Headache Spring 2021    HL (hearing loss) 2019    Hyperlipidemia     Hypertension     Hyperthyroidism     Hypothyroidism     Osteopenia     Sleep apnea 2016         Past Surgical History:   Procedure Laterality Date    ADENOIDECTOMY      COLONOSCOPY  2018    COSMETIC SURGERY  1965    ENDOMETRIAL ABLATION  1982    EYE SURGERY  2012    Cataracts    FRACTURE SURGERY  1965    JOINT REPLACEMENT Left 2020    knee    TONSILLECTOMY  3    TUBAL ABDOMINAL LIGATION  1982           Current Outpatient Medications:     Cholecalciferol (VITAMIN D3) 2000 units tablet, Daily., Disp: , Rfl:     Cyanocobalamin (B-12) 1000 MCG tablet, Daily., Disp: , Rfl:     FLUoxetine (PROzac) 20 MG capsule, TAKE 1 CAPSULE BY MOUTH EVERY DAY, Disp: 90 capsule, Rfl: 3    levothyroxine (Synthroid) 175 MCG tablet, Take 1 tablet by mouth Daily., Disp: 90 tablet, Rfl: 1    lisinopril (PRINIVIL,ZESTRIL) 10 MG tablet, TAKE 1 TABLET BY MOUTH DAILY, Disp: 90 tablet, Rfl: 3    meclizine (ANTIVERT) 25 MG tablet, Take 1 tablet by mouth 3 (Three) Times a Day As Needed for Dizziness or Nausea., Disp: 30 tablet, Rfl: 1    omeprazole (priLOSEC) 20 MG capsule, TAKE 1 CAPSULE BY MOUTH DAILY, Disp: 90 capsule, Rfl: 3    rosuvastatin (CRESTOR) 20 MG tablet, TAKE 1 TABLET BY MOUTH EVERY NIGHT AT BEDTIME, Disp: 90 tablet, Rfl: 3      Social History     Socioeconomic History    Marital status:    Tobacco Use    Smoking status: Former     Current packs/day: 0.00     Average packs/day: 1.5 packs/day for 36.0 years (54.0 ttl pk-yrs)     Types: Cigarettes     Start date: 1973     Quit date: 1994     Years since quittin.4    Smokeless tobacco: Never    Tobacco comments:     quit 25 yrs ago   Vaping Use    Vaping status: Never Used   Substance and Sexual Activity    Alcohol use: Not Currently     Alcohol/week:  "9.0 standard drinks of alcohol     Types: 9 Shots of liquor per week     Comment: Currently abstinent to try to lower liver numbers    Drug use: Never    Sexual activity: Not Currently     Partners: Female     Birth control/protection: Post-menopausal, Partner of same sex         Review of Systems   Constitutional: Negative for chills and fever.   HENT:  Negative for ear discharge and nosebleeds.    Eyes:  Negative for discharge and redness.   Cardiovascular:  Positive for syncope. Negative for chest pain, orthopnea, palpitations and paroxysmal nocturnal dyspnea.   Respiratory:  Positive for shortness of breath. Negative for cough and wheezing.    Endocrine: Negative for heat intolerance.   Skin:  Negative for rash.   Musculoskeletal:  Negative for arthritis and myalgias.   Gastrointestinal:  Negative for abdominal pain, melena, nausea and vomiting.   Genitourinary:  Negative for dysuria and hematuria.   Neurological:  Negative for dizziness, light-headedness, numbness and tremors.   Psychiatric/Behavioral:  Negative for depression. The patient is not nervous/anxious.        Procedures      ECG 12 Lead    Date/Time: 6/24/2025 4:03 PM  Performed by: Eleazar Nieto MD    Authorized by: Eleazar Nieto MD  Previous ECG: no previous ECG available  Rhythm: sinus rhythm    Clinical impression: normal ECG          ECG 12 Lead    (Results Pending)           Objective:    /88 (BP Location: Right arm, Patient Position: Sitting, Cuff Size: Large Adult)   Pulse 79   Resp 16   Ht 170 cm (66.93\")   Wt 86.2 kg (190 lb)   SpO2 96%   BMI 29.82 kg/m²         Constitutional:       Appearance: Well-developed.   Eyes:      General: No scleral icterus.        Right eye: No discharge.   HENT:      Head: Normocephalic and atraumatic.   Neck:      Thyroid: No thyromegaly.      Lymphadenopathy: No cervical adenopathy.   Pulmonary:      Effort: Pulmonary effort is normal. No respiratory distress.      Breath sounds: Normal breath " sounds. No wheezing. No rales.   Cardiovascular:      Normal rate. Regular rhythm.      No gallop.    Edema:     Peripheral edema absent.   Abdominal:      Tenderness: There is no abdominal tenderness.   Skin:     Findings: No erythema or rash.   Neurological:      Mental Status: Alert and oriented to person, place, and time.             Assessment:       Diagnosis Plan   1. Primary hypertension  ECG 12 Lead    Adult Transthoracic Echo Complete W/ Cont if Necessary Per Protocol    Cardiac Event Monitor (LATANYA) or Mobile Cardiac Outpatient Telemetry (MCT)    Stress Test With Myocardial Perfusion One Day    Tilt Table      2. Mixed hyperlipidemia  ECG 12 Lead    Adult Transthoracic Echo Complete W/ Cont if Necessary Per Protocol    Cardiac Event Monitor (LATANYA) or Mobile Cardiac Outpatient Telemetry (MCT)    Stress Test With Myocardial Perfusion One Day    Tilt Table      3. Syncope and collapse  Adult Transthoracic Echo Complete W/ Cont if Necessary Per Protocol    Cardiac Event Monitor (LATANYA) or Mobile Cardiac Outpatient Telemetry (MCT)    Stress Test With Myocardial Perfusion One Day    Tilt Table      4. Syncope anginosa  Stress Test With Myocardial Perfusion One Day               Plan:       MDM:    1.  Syncope:    I would recommend that patient should proceed with ischemic evaluation and echocardiogram should be done.  However patient would need tilt table test and loop recorder implantation.  I strongly believe that patient may have intermittent bradycardia.  However seizure needs to be excluded    2.  Hypertension:    Blood pressure is slightly high patient is advised to monitor the blood pressure and bring the logbook    3.  Mixed hyperlipidemia:    Patient is on Crestor

## 2025-06-25 DIAGNOSIS — R00.1 BRADYCARDIA, SINUS: Primary | ICD-10-CM

## 2025-06-26 ENCOUNTER — HOSPITAL ENCOUNTER (OUTPATIENT)
Dept: MRI IMAGING | Facility: HOSPITAL | Age: 75
Discharge: HOME | End: 2025-06-26
Admitting: FAMILY MEDICINE
Payer: MEDICARE

## 2025-06-26 DIAGNOSIS — M54.12 CERVICAL RADICULOPATHY: ICD-10-CM

## 2025-06-26 PROCEDURE — 72141 MRI NECK SPINE W/O DYE: CPT

## 2025-06-27 ENCOUNTER — PATIENT ROUNDING (BHMG ONLY) (OUTPATIENT)
Dept: CARDIOLOGY | Facility: CLINIC | Age: 75
End: 2025-06-27
Payer: MEDICARE

## 2025-06-27 DIAGNOSIS — M48.02 FORAMINAL STENOSIS OF CERVICAL REGION: Primary | ICD-10-CM

## 2025-07-12 ENCOUNTER — HOSPITAL ENCOUNTER (OUTPATIENT)
Dept: CARDIOLOGY | Facility: HOSPITAL | Age: 75
Discharge: HOME | End: 2025-07-12
Payer: MEDICARE

## 2025-07-12 DIAGNOSIS — E78.2 MIXED HYPERLIPIDEMIA: ICD-10-CM

## 2025-07-12 DIAGNOSIS — I10 PRIMARY HYPERTENSION: ICD-10-CM

## 2025-07-12 DIAGNOSIS — R55 SYNCOPE AND COLLAPSE: ICD-10-CM

## 2025-07-12 LAB
AORTIC DIMENSIONLESS INDEX: 0.72 (DI)
AV MEAN PRESS GRAD SYS DOP V1V2: 3 MMHG
AV VMAX SYS DOP: 127 CM/SEC
BH CV ECHO LEFT VENTRICLE GLOBAL LONGITUDINAL STRAIN: -13.2 %
BH CV ECHO MEAS - ACS: 1.7 CM
BH CV ECHO MEAS - AO MAX PG: 6.5 MMHG
BH CV ECHO MEAS - AO V2 VTI: 25.6 CM
BH CV ECHO MEAS - AVA(I,D): 2.05 CM2
BH CV ECHO MEAS - EDV(CUBED): 117.6 ML
BH CV ECHO MEAS - EDV(MOD-SP4): 85.9 ML
BH CV ECHO MEAS - EF(MOD-SP4): 76.4 %
BH CV ECHO MEAS - ESV(CUBED): 32.8 ML
BH CV ECHO MEAS - ESV(MOD-SP4): 20.3 ML
BH CV ECHO MEAS - FS: 34.7 %
BH CV ECHO MEAS - IVS/LVPW: 0.88 CM
BH CV ECHO MEAS - IVSD: 0.7 CM
BH CV ECHO MEAS - LA DIMENSION: 4.1 CM
BH CV ECHO MEAS - LAT PEAK E' VEL: 7.4 CM/SEC
BH CV ECHO MEAS - LV MASS(C)D: 120.8 GRAMS
BH CV ECHO MEAS - LV MAX PG: 3.6 MMHG
BH CV ECHO MEAS - LV MEAN PG: 1 MMHG
BH CV ECHO MEAS - LV V1 MAX: 95 CM/SEC
BH CV ECHO MEAS - LV V1 VTI: 18.5 CM
BH CV ECHO MEAS - LVIDD: 4.9 CM
BH CV ECHO MEAS - LVIDS: 3.2 CM
BH CV ECHO MEAS - LVOT AREA: 2.8 CM2
BH CV ECHO MEAS - LVOT DIAM: 1.9 CM
BH CV ECHO MEAS - LVPWD: 0.8 CM
BH CV ECHO MEAS - MED PEAK E' VEL: 7 CM/SEC
BH CV ECHO MEAS - PA V2 MAX: 91.9 CM/SEC
BH CV ECHO MEAS - PULM A REVS DUR: 0.11 SEC
BH CV ECHO MEAS - PULM A REVS VEL: 20.5 CM/SEC
BH CV ECHO MEAS - PULM DIAS VEL: 34.7 CM/SEC
BH CV ECHO MEAS - PULM S/D: 1.6
BH CV ECHO MEAS - PULM SYS VEL: 55.6 CM/SEC
BH CV ECHO MEAS - QP/QS: 0.5
BH CV ECHO MEAS - RV MAX PG: 2.35 MMHG
BH CV ECHO MEAS - RV V1 MAX: 76.6 CM/SEC
BH CV ECHO MEAS - RV V1 VTI: 17.2 CM
BH CV ECHO MEAS - RVDD: 2.9 CM
BH CV ECHO MEAS - RVOT DIAM: 1.4 CM
BH CV ECHO MEAS - SV(LVOT): 52.5 ML
BH CV ECHO MEAS - SV(MOD-SP4): 65.6 ML
BH CV ECHO MEAS - SV(RVOT): 26.5 ML
BH CV ECHO MEAS - TAPSE (>1.6): 2.09 CM
BH CV XLRA - TDI S': 14.5 CM/SEC
SINUS: 3 CM

## 2025-07-12 PROCEDURE — 93356 MYOCRD STRAIN IMG SPCKL TRCK: CPT

## 2025-07-12 PROCEDURE — 93306 TTE W/DOPPLER COMPLETE: CPT

## 2025-07-15 ENCOUNTER — HOSPITAL ENCOUNTER (OUTPATIENT)
Dept: NUCLEAR MEDICINE | Facility: HOSPITAL | Age: 75
Discharge: HOME | End: 2025-07-15
Payer: MEDICARE

## 2025-07-15 DIAGNOSIS — R55 SYNCOPE AND COLLAPSE: ICD-10-CM

## 2025-07-15 DIAGNOSIS — E78.2 MIXED HYPERLIPIDEMIA: ICD-10-CM

## 2025-07-15 DIAGNOSIS — I20.89 SYNCOPE ANGINOSA: ICD-10-CM

## 2025-07-15 DIAGNOSIS — I10 PRIMARY HYPERTENSION: ICD-10-CM

## 2025-07-15 LAB
BH CV NUCLEAR PRIOR STUDY: 2
BH CV REST NUCLEAR ISOTOPE DOSE: 10 MCI
BH CV STRESS BP STAGE 1: NORMAL
BH CV STRESS BP STAGE 2: NORMAL
BH CV STRESS BP STAGE 3: NORMAL
BH CV STRESS BP STAGE 4: NORMAL
BH CV STRESS COMMENTS STAGE 1: NORMAL
BH CV STRESS DOSE REGADENOSON STAGE 1: 0.4
BH CV STRESS DURATION MIN STAGE 1: 1
BH CV STRESS DURATION MIN STAGE 2: 1
BH CV STRESS DURATION MIN STAGE 3: 1
BH CV STRESS DURATION MIN STAGE 4: 1
BH CV STRESS DURATION SEC STAGE 2: 0
BH CV STRESS HR STAGE 1: 93
BH CV STRESS HR STAGE 2: 94
BH CV STRESS HR STAGE 3: 89
BH CV STRESS HR STAGE 4: 83
BH CV STRESS NUCLEAR ISOTOPE DOSE: 33 MCI
BH CV STRESS PROTOCOL 1: NORMAL
BH CV STRESS RECOVERY BP: NORMAL MMHG
BH CV STRESS RECOVERY HR: 89 BPM
BH CV STRESS STAGE 1: 1
BH CV STRESS STAGE 2: 2
BH CV STRESS STAGE 3: 3
BH CV STRESS STAGE 4: 4
MAXIMAL PREDICTED HEART RATE: 144 BPM
PERCENT MAX PREDICTED HR: 65.28 %
SPECT HRT GATED+EF W RNC IV: 70 %
STRESS BASELINE BP: NORMAL MMHG
STRESS BASELINE HR: 65 BPM
STRESS PERCENT HR: 77 %
STRESS POST PEAK BP: NORMAL MMHG
STRESS POST PEAK HR: 94 BPM
STRESS TARGET HR: 122 BPM

## 2025-07-15 PROCEDURE — 34310000005 TECHNETIUM TETROFOSMIN KIT: Performed by: INTERNAL MEDICINE

## 2025-07-15 PROCEDURE — 25010000002 REGADENOSON 0.4 MG/5ML SOLUTION: Performed by: INTERNAL MEDICINE

## 2025-07-15 PROCEDURE — 93017 CV STRESS TEST TRACING ONLY: CPT

## 2025-07-15 PROCEDURE — 78452 HT MUSCLE IMAGE SPECT MULT: CPT

## 2025-07-15 PROCEDURE — A9502 TC99M TETROFOSMIN: HCPCS | Performed by: INTERNAL MEDICINE

## 2025-07-15 RX ORDER — REGADENOSON 0.08 MG/ML
0.4 INJECTION, SOLUTION INTRAVENOUS
Status: COMPLETED | OUTPATIENT
Start: 2025-07-15 | End: 2025-07-15

## 2025-07-15 RX ADMIN — TETROFOSMIN 1 DOSE: 1.38 INJECTION, POWDER, LYOPHILIZED, FOR SOLUTION INTRAVENOUS at 09:17

## 2025-07-15 RX ADMIN — TETROFOSMIN 1 DOSE: 1.38 INJECTION, POWDER, LYOPHILIZED, FOR SOLUTION INTRAVENOUS at 08:19

## 2025-07-15 RX ADMIN — REGADENOSON 0.4 MG: 0.08 INJECTION, SOLUTION INTRAVENOUS at 09:17

## 2025-07-30 ENCOUNTER — PATIENT MESSAGE (OUTPATIENT)
Dept: FAMILY MEDICINE CLINIC | Facility: CLINIC | Age: 75
End: 2025-07-30
Payer: MEDICARE

## 2025-08-05 ENCOUNTER — HOSPITAL ENCOUNTER (OUTPATIENT)
Dept: CARDIOLOGY | Facility: HOSPITAL | Age: 75
Discharge: HOME | End: 2025-08-05
Payer: MEDICARE

## 2025-08-05 VITALS — OXYGEN SATURATION: 98 % | SYSTOLIC BLOOD PRESSURE: 130 MMHG | DIASTOLIC BLOOD PRESSURE: 83 MMHG | HEART RATE: 76 BPM

## 2025-08-05 VITALS
DIASTOLIC BLOOD PRESSURE: 68 MMHG | RESPIRATION RATE: 19 BRPM | SYSTOLIC BLOOD PRESSURE: 129 MMHG | HEART RATE: 70 BPM | OXYGEN SATURATION: 96 %

## 2025-08-05 DIAGNOSIS — R00.1 BRADYCARDIA, SINUS: ICD-10-CM

## 2025-08-05 DIAGNOSIS — I10 PRIMARY HYPERTENSION: ICD-10-CM

## 2025-08-05 DIAGNOSIS — R55 SYNCOPE AND COLLAPSE: ICD-10-CM

## 2025-08-05 DIAGNOSIS — E78.2 MIXED HYPERLIPIDEMIA: ICD-10-CM

## 2025-08-05 PROCEDURE — 33285 INSJ SUBQ CAR RHYTHM MNTR: CPT

## 2025-08-05 PROCEDURE — 25010000002 LIDOCAINE 2% SOLUTION: Performed by: INTERNAL MEDICINE

## 2025-08-05 PROCEDURE — C1764 EVENT RECORDER, CARDIAC: HCPCS

## 2025-08-05 PROCEDURE — 93660 TILT TABLE EVALUATION: CPT

## 2025-08-05 RX ORDER — LIDOCAINE HYDROCHLORIDE 20 MG/ML
INJECTION, SOLUTION INFILTRATION; PERINEURAL
Status: COMPLETED | OUTPATIENT
Start: 2025-08-05 | End: 2025-08-05

## 2025-08-05 RX ADMIN — LIDOCAINE HYDROCHLORIDE 10 ML: 20 INJECTION, SOLUTION INFILTRATION; PERINEURAL at 12:05

## 2025-08-06 LAB
MC_CV_MDC_IDC_RATE_1: 154
MC_CV_MDC_IDC_RATE_1: 3
MC_CV_MDC_IDC_RATE_1: 30
MC_CV_MDC_IDC_ZONE_ID: 1
MC_CV_MDC_IDC_ZONE_ID: 2
MC_CV_MDC_IDC_ZONE_ID: 3
MC_CV_MDC_IDC_ZONE_ID: 4
MC_CV_MDC_IDC_ZONE_ID: 7
MDC_IDC_MSMT_BATTERY_STATUS: NORMAL
MDC_IDC_PG_IMPLANT_DTM: NORMAL
MDC_IDC_PG_MFG: NORMAL
MDC_IDC_PG_MODEL: NORMAL
MDC_IDC_PG_SERIAL: NORMAL
MDC_IDC_PG_TYPE: NORMAL
MDC_IDC_SESS_DTM: NORMAL
MDC_IDC_SESS_TYPE: NORMAL
MDC_IDC_SET_ZONE_DETECTION_DETAILS: 16
MDC_IDC_SET_ZONE_DETECTION_DETAILS: 4
MDC_IDC_SET_ZONE_STATUS: NORMAL
MDC_IDC_SET_ZONE_TYPE: NORMAL

## 2025-08-07 ENCOUNTER — TELEPHONE (OUTPATIENT)
Dept: CARDIOLOGY | Facility: CLINIC | Age: 75
End: 2025-08-07
Payer: MEDICARE
